# Patient Record
Sex: MALE | HISPANIC OR LATINO | Employment: FULL TIME | URBAN - NONMETROPOLITAN AREA
[De-identification: names, ages, dates, MRNs, and addresses within clinical notes are randomized per-mention and may not be internally consistent; named-entity substitution may affect disease eponyms.]

---

## 2021-05-11 ENCOUNTER — APPOINTMENT (OUTPATIENT)
Dept: GENERAL RADIOLOGY | Facility: HOSPITAL | Age: 66
End: 2021-05-11

## 2021-05-11 ENCOUNTER — HOSPITAL ENCOUNTER (INPATIENT)
Facility: HOSPITAL | Age: 66
LOS: 4 days | Discharge: HOME | End: 2021-05-17
Attending: FAMILY MEDICINE | Admitting: FAMILY MEDICINE

## 2021-05-11 DIAGNOSIS — S92.912B: ICD-10-CM

## 2021-05-11 DIAGNOSIS — Z74.09 IMPAIRED MOBILITY AND ADLS: ICD-10-CM

## 2021-05-11 DIAGNOSIS — Z78.9 IMPAIRED MOBILITY AND ADLS: ICD-10-CM

## 2021-05-11 DIAGNOSIS — L03.116 CELLULITIS OF LEFT LOWER EXTREMITY: Primary | ICD-10-CM

## 2021-05-11 DIAGNOSIS — R73.9 HYPERGLYCEMIA: ICD-10-CM

## 2021-05-11 DIAGNOSIS — L03.115 CELLULITIS OF RIGHT FOOT: ICD-10-CM

## 2021-05-11 DIAGNOSIS — Z74.09 IMPAIRED FUNCTIONAL MOBILITY, BALANCE, AND ENDURANCE: ICD-10-CM

## 2021-05-11 PROBLEM — E87.1 HYPONATREMIA: Status: ACTIVE | Noted: 2021-05-11

## 2021-05-11 PROBLEM — E11.9 TYPE 2 DIABETES MELLITUS (HCC): Status: ACTIVE | Noted: 2021-05-11

## 2021-05-11 LAB
ALBUMIN SERPL-MCNC: 3.6 G/DL (ref 3.5–5.2)
ALBUMIN/GLOB SERPL: 0.9 G/DL
ALP SERPL-CCNC: 126 U/L (ref 39–117)
ALT SERPL W P-5'-P-CCNC: 11 U/L (ref 1–41)
ANION GAP SERPL CALCULATED.3IONS-SCNC: 8 MMOL/L (ref 5–15)
AST SERPL-CCNC: 13 U/L (ref 1–40)
BASOPHILS # BLD AUTO: 0.06 10*3/MM3 (ref 0–0.2)
BASOPHILS NFR BLD AUTO: 0.5 % (ref 0–1.5)
BILIRUB SERPL-MCNC: 0.7 MG/DL (ref 0–1.2)
BUN SERPL-MCNC: 15 MG/DL (ref 8–23)
BUN/CREAT SERPL: 20.8 (ref 7–25)
CALCIUM SPEC-SCNC: 9 MG/DL (ref 8.6–10.5)
CHLORIDE SERPL-SCNC: 92 MMOL/L (ref 98–107)
CK SERPL-CCNC: 64 U/L (ref 20–200)
CO2 SERPL-SCNC: 27 MMOL/L (ref 22–29)
CREAT SERPL-MCNC: 0.72 MG/DL (ref 0.76–1.27)
D-LACTATE SERPL-SCNC: 1.4 MMOL/L (ref 0.5–2)
DEPRECATED RDW RBC AUTO: 47.1 FL (ref 37–54)
EOSINOPHIL # BLD AUTO: 0.01 10*3/MM3 (ref 0–0.4)
EOSINOPHIL NFR BLD AUTO: 0.1 % (ref 0.3–6.2)
ERYTHROCYTE [DISTWIDTH] IN BLOOD BY AUTOMATED COUNT: 14.8 % (ref 12.3–15.4)
FLUAV RNA RESP QL NAA+PROBE: NOT DETECTED
FLUBV RNA RESP QL NAA+PROBE: NOT DETECTED
GFR SERPL CREATININE-BSD FRML MDRD: 110 ML/MIN/1.73
GFR SERPL CREATININE-BSD FRML MDRD: 133 ML/MIN/1.73
GLOBULIN UR ELPH-MCNC: 3.9 GM/DL
GLUCOSE BLDC GLUCOMTR-MCNC: 267 MG/DL (ref 70–130)
GLUCOSE SERPL-MCNC: 341 MG/DL (ref 65–99)
HBA1C MFR BLD: 10.5 % (ref 4.8–5.6)
HCT VFR BLD AUTO: 36.8 % (ref 37.5–51)
HGB BLD-MCNC: 12.3 G/DL (ref 13–17.7)
HOLD SPECIMEN: NORMAL
IMM GRANULOCYTES # BLD AUTO: 0.04 10*3/MM3 (ref 0–0.05)
IMM GRANULOCYTES NFR BLD AUTO: 0.3 % (ref 0–0.5)
LYMPHOCYTES # BLD AUTO: 1.49 10*3/MM3 (ref 0.7–3.1)
LYMPHOCYTES NFR BLD AUTO: 12.7 % (ref 19.6–45.3)
MCH RBC QN AUTO: 29 PG (ref 26.6–33)
MCHC RBC AUTO-ENTMCNC: 33.4 G/DL (ref 31.5–35.7)
MCV RBC AUTO: 86.8 FL (ref 79–97)
MONOCYTES # BLD AUTO: 1.03 10*3/MM3 (ref 0.1–0.9)
MONOCYTES NFR BLD AUTO: 8.8 % (ref 5–12)
NEUTROPHILS NFR BLD AUTO: 77.6 % (ref 42.7–76)
NEUTROPHILS NFR BLD AUTO: 9.14 10*3/MM3 (ref 1.7–7)
NRBC BLD AUTO-RTO: 0 /100 WBC (ref 0–0.2)
PLATELET # BLD AUTO: 396 10*3/MM3 (ref 140–450)
PMV BLD AUTO: 8.8 FL (ref 6–12)
POTASSIUM SERPL-SCNC: 4.2 MMOL/L (ref 3.5–5.2)
PROT SERPL-MCNC: 7.5 G/DL (ref 6–8.5)
RBC # BLD AUTO: 4.24 10*6/MM3 (ref 4.14–5.8)
SARS-COV-2 RNA RESP QL NAA+PROBE: NOT DETECTED
SODIUM SERPL-SCNC: 127 MMOL/L (ref 136–145)
WBC # BLD AUTO: 11.77 10*3/MM3 (ref 3.4–10.8)
WHOLE BLOOD HOLD SPECIMEN: NORMAL

## 2021-05-11 PROCEDURE — 25010000002 CEFEPIME PER 500 MG: Performed by: FAMILY MEDICINE

## 2021-05-11 PROCEDURE — 80053 COMPREHEN METABOLIC PANEL: CPT | Performed by: FAMILY MEDICINE

## 2021-05-11 PROCEDURE — 63710000001 INSULIN ASPART PER 5 UNITS: Performed by: FAMILY MEDICINE

## 2021-05-11 PROCEDURE — 82550 ASSAY OF CK (CPK): CPT | Performed by: FAMILY MEDICINE

## 2021-05-11 PROCEDURE — G0378 HOSPITAL OBSERVATION PER HR: HCPCS

## 2021-05-11 PROCEDURE — 83036 HEMOGLOBIN GLYCOSYLATED A1C: CPT | Performed by: FAMILY MEDICINE

## 2021-05-11 PROCEDURE — 82962 GLUCOSE BLOOD TEST: CPT

## 2021-05-11 PROCEDURE — 83605 ASSAY OF LACTIC ACID: CPT | Performed by: FAMILY MEDICINE

## 2021-05-11 PROCEDURE — 85025 COMPLETE CBC W/AUTO DIFF WBC: CPT | Performed by: FAMILY MEDICINE

## 2021-05-11 PROCEDURE — 99284 EMERGENCY DEPT VISIT MOD MDM: CPT

## 2021-05-11 PROCEDURE — 87636 SARSCOV2 & INF A&B AMP PRB: CPT | Performed by: FAMILY MEDICINE

## 2021-05-11 PROCEDURE — 87040 BLOOD CULTURE FOR BACTERIA: CPT | Performed by: FAMILY MEDICINE

## 2021-05-11 PROCEDURE — 73630 X-RAY EXAM OF FOOT: CPT

## 2021-05-11 PROCEDURE — 63710000001 INSULIN DETEMIR PER 5 UNITS: Performed by: FAMILY MEDICINE

## 2021-05-11 PROCEDURE — 36415 COLL VENOUS BLD VENIPUNCTURE: CPT | Performed by: FAMILY MEDICINE

## 2021-05-11 RX ORDER — SODIUM CHLORIDE 0.9 % (FLUSH) 0.9 %
10 SYRINGE (ML) INJECTION AS NEEDED
Status: DISCONTINUED | OUTPATIENT
Start: 2021-05-11 | End: 2021-05-17 | Stop reason: HOSPADM

## 2021-05-11 RX ORDER — ACETAMINOPHEN 160 MG/5ML
650 SOLUTION ORAL EVERY 4 HOURS PRN
Status: DISCONTINUED | OUTPATIENT
Start: 2021-05-11 | End: 2021-05-17 | Stop reason: HOSPADM

## 2021-05-11 RX ORDER — HYDROCODONE BITARTRATE AND ACETAMINOPHEN 7.5; 325 MG/1; MG/1
1 TABLET ORAL EVERY 4 HOURS PRN
Status: DISCONTINUED | OUTPATIENT
Start: 2021-05-11 | End: 2021-05-17 | Stop reason: HOSPADM

## 2021-05-11 RX ORDER — CALCIUM CARBONATE 200(500)MG
2 TABLET,CHEWABLE ORAL 2 TIMES DAILY PRN
Status: DISCONTINUED | OUTPATIENT
Start: 2021-05-11 | End: 2021-05-17 | Stop reason: HOSPADM

## 2021-05-11 RX ORDER — NALOXONE HCL 0.4 MG/ML
0.4 VIAL (ML) INJECTION
Status: DISCONTINUED | OUTPATIENT
Start: 2021-05-11 | End: 2021-05-17 | Stop reason: HOSPADM

## 2021-05-11 RX ORDER — SODIUM CHLORIDE 0.9 % (FLUSH) 0.9 %
10 SYRINGE (ML) INJECTION EVERY 12 HOURS SCHEDULED
Status: DISCONTINUED | OUTPATIENT
Start: 2021-05-11 | End: 2021-05-17 | Stop reason: HOSPADM

## 2021-05-11 RX ORDER — ONDANSETRON 2 MG/ML
4 INJECTION INTRAMUSCULAR; INTRAVENOUS EVERY 6 HOURS PRN
Status: DISCONTINUED | OUTPATIENT
Start: 2021-05-11 | End: 2021-05-17 | Stop reason: HOSPADM

## 2021-05-11 RX ORDER — ONDANSETRON 4 MG/1
4 TABLET, FILM COATED ORAL EVERY 6 HOURS PRN
Status: DISCONTINUED | OUTPATIENT
Start: 2021-05-11 | End: 2021-05-17 | Stop reason: HOSPADM

## 2021-05-11 RX ORDER — ACETAMINOPHEN 650 MG/1
650 SUPPOSITORY RECTAL EVERY 4 HOURS PRN
Status: DISCONTINUED | OUTPATIENT
Start: 2021-05-11 | End: 2021-05-17 | Stop reason: HOSPADM

## 2021-05-11 RX ORDER — DEXTROSE MONOHYDRATE 25 G/50ML
25 INJECTION, SOLUTION INTRAVENOUS
Status: DISCONTINUED | OUTPATIENT
Start: 2021-05-11 | End: 2021-05-17 | Stop reason: HOSPADM

## 2021-05-11 RX ORDER — NICOTINE POLACRILEX 4 MG
15 LOZENGE BUCCAL
Status: DISCONTINUED | OUTPATIENT
Start: 2021-05-11 | End: 2021-05-17 | Stop reason: HOSPADM

## 2021-05-11 RX ORDER — SODIUM CHLORIDE 9 MG/ML
75 INJECTION, SOLUTION INTRAVENOUS CONTINUOUS
Status: DISCONTINUED | OUTPATIENT
Start: 2021-05-11 | End: 2021-05-17 | Stop reason: HOSPADM

## 2021-05-11 RX ORDER — ACETAMINOPHEN 325 MG/1
650 TABLET ORAL EVERY 4 HOURS PRN
Status: DISCONTINUED | OUTPATIENT
Start: 2021-05-11 | End: 2021-05-17 | Stop reason: HOSPADM

## 2021-05-11 RX ORDER — MORPHINE SULFATE 2 MG/ML
1 INJECTION, SOLUTION INTRAMUSCULAR; INTRAVENOUS EVERY 4 HOURS PRN
Status: DISCONTINUED | OUTPATIENT
Start: 2021-05-11 | End: 2021-05-17 | Stop reason: HOSPADM

## 2021-05-11 RX ADMIN — INSULIN DETEMIR 10 UNITS: 100 INJECTION, SOLUTION SUBCUTANEOUS at 22:25

## 2021-05-11 RX ADMIN — INSULIN ASPART 4 UNITS: 100 INJECTION, SOLUTION INTRAVENOUS; SUBCUTANEOUS at 22:24

## 2021-05-11 RX ADMIN — VANCOMYCIN HYDROCHLORIDE 1000 MG: 1 INJECTION, POWDER, LYOPHILIZED, FOR SOLUTION INTRAVENOUS at 18:49

## 2021-05-11 RX ADMIN — CEFEPIME HYDROCHLORIDE 2 G: 2 INJECTION, POWDER, FOR SOLUTION INTRAVENOUS at 17:52

## 2021-05-11 RX ADMIN — SODIUM CHLORIDE 75 ML/HR: 900 INJECTION, SOLUTION INTRAVENOUS at 20:27

## 2021-05-12 LAB
ALBUMIN SERPL-MCNC: 3.5 G/DL (ref 3.5–5.2)
ALBUMIN/GLOB SERPL: 1.2 G/DL
ALP SERPL-CCNC: 129 U/L (ref 39–117)
ALT SERPL W P-5'-P-CCNC: 8 U/L (ref 1–41)
ANION GAP SERPL CALCULATED.3IONS-SCNC: 7 MMOL/L (ref 5–15)
AST SERPL-CCNC: 12 U/L (ref 1–40)
BASOPHILS # BLD AUTO: 0.05 10*3/MM3 (ref 0–0.2)
BASOPHILS NFR BLD AUTO: 0.4 % (ref 0–1.5)
BILIRUB SERPL-MCNC: 0.8 MG/DL (ref 0–1.2)
BUN SERPL-MCNC: 11 MG/DL (ref 8–23)
BUN/CREAT SERPL: 20.8 (ref 7–25)
CALCIUM SPEC-SCNC: 8.7 MG/DL (ref 8.6–10.5)
CHLORIDE SERPL-SCNC: 103 MMOL/L (ref 98–107)
CO2 SERPL-SCNC: 30 MMOL/L (ref 22–29)
CREAT SERPL-MCNC: 0.53 MG/DL (ref 0.76–1.27)
CRP SERPL-MCNC: 10.41 MG/DL (ref 0–0.5)
DEPRECATED RDW RBC AUTO: 47.6 FL (ref 37–54)
EOSINOPHIL # BLD AUTO: 0.05 10*3/MM3 (ref 0–0.4)
EOSINOPHIL NFR BLD AUTO: 0.4 % (ref 0.3–6.2)
ERYTHROCYTE [DISTWIDTH] IN BLOOD BY AUTOMATED COUNT: 14.8 % (ref 12.3–15.4)
ERYTHROCYTE [SEDIMENTATION RATE] IN BLOOD: 84 MM/HR (ref 0–15)
GFR SERPL CREATININE-BSD FRML MDRD: >150 ML/MIN/1.73
GFR SERPL CREATININE-BSD FRML MDRD: >150 ML/MIN/1.73
GLOBULIN UR ELPH-MCNC: 3 GM/DL
GLUCOSE BLDC GLUCOMTR-MCNC: 169 MG/DL (ref 70–130)
GLUCOSE BLDC GLUCOMTR-MCNC: 181 MG/DL (ref 70–130)
GLUCOSE BLDC GLUCOMTR-MCNC: 277 MG/DL (ref 70–130)
GLUCOSE BLDC GLUCOMTR-MCNC: 346 MG/DL (ref 70–130)
GLUCOSE BLDC GLUCOMTR-MCNC: 59 MG/DL (ref 70–130)
GLUCOSE SERPL-MCNC: 69 MG/DL (ref 65–99)
HCT VFR BLD AUTO: 35.4 % (ref 37.5–51)
HGB BLD-MCNC: 11.8 G/DL (ref 13–17.7)
IMM GRANULOCYTES # BLD AUTO: 0.04 10*3/MM3 (ref 0–0.05)
IMM GRANULOCYTES NFR BLD AUTO: 0.3 % (ref 0–0.5)
LYMPHOCYTES # BLD AUTO: 2.03 10*3/MM3 (ref 0.7–3.1)
LYMPHOCYTES NFR BLD AUTO: 17.4 % (ref 19.6–45.3)
MCH RBC QN AUTO: 28.9 PG (ref 26.6–33)
MCHC RBC AUTO-ENTMCNC: 33.3 G/DL (ref 31.5–35.7)
MCV RBC AUTO: 86.8 FL (ref 79–97)
MONOCYTES # BLD AUTO: 1.17 10*3/MM3 (ref 0.1–0.9)
MONOCYTES NFR BLD AUTO: 10.1 % (ref 5–12)
NEUTROPHILS NFR BLD AUTO: 71.4 % (ref 42.7–76)
NEUTROPHILS NFR BLD AUTO: 8.3 10*3/MM3 (ref 1.7–7)
NRBC BLD AUTO-RTO: 0 /100 WBC (ref 0–0.2)
PLATELET # BLD AUTO: 398 10*3/MM3 (ref 140–450)
PMV BLD AUTO: 8.9 FL (ref 6–12)
POTASSIUM SERPL-SCNC: 3.7 MMOL/L (ref 3.5–5.2)
PROT SERPL-MCNC: 6.5 G/DL (ref 6–8.5)
RBC # BLD AUTO: 4.08 10*6/MM3 (ref 4.14–5.8)
SODIUM SERPL-SCNC: 140 MMOL/L (ref 136–145)
VANCOMYCIN PEAK SERPL-MCNC: 15.7 MCG/ML (ref 20–40)
WBC # BLD AUTO: 11.64 10*3/MM3 (ref 3.4–10.8)

## 2021-05-12 PROCEDURE — 94760 N-INVAS EAR/PLS OXIMETRY 1: CPT

## 2021-05-12 PROCEDURE — 80053 COMPREHEN METABOLIC PANEL: CPT | Performed by: FAMILY MEDICINE

## 2021-05-12 PROCEDURE — 85025 COMPLETE CBC W/AUTO DIFF WBC: CPT | Performed by: FAMILY MEDICINE

## 2021-05-12 PROCEDURE — 82962 GLUCOSE BLOOD TEST: CPT

## 2021-05-12 PROCEDURE — 25010000002 CEFEPIME PER 500 MG: Performed by: FAMILY MEDICINE

## 2021-05-12 PROCEDURE — 80202 ASSAY OF VANCOMYCIN: CPT | Performed by: FAMILY MEDICINE

## 2021-05-12 PROCEDURE — G0378 HOSPITAL OBSERVATION PER HR: HCPCS

## 2021-05-12 PROCEDURE — 85651 RBC SED RATE NONAUTOMATED: CPT | Performed by: PODIATRIST

## 2021-05-12 PROCEDURE — 25010000002 VANCOMYCIN 5 G RECONSTITUTED SOLUTION: Performed by: FAMILY MEDICINE

## 2021-05-12 PROCEDURE — 63710000001 INSULIN ASPART PER 5 UNITS: Performed by: FAMILY MEDICINE

## 2021-05-12 PROCEDURE — 99221 1ST HOSP IP/OBS SF/LOW 40: CPT | Performed by: PODIATRIST

## 2021-05-12 PROCEDURE — 86140 C-REACTIVE PROTEIN: CPT | Performed by: PODIATRIST

## 2021-05-12 PROCEDURE — 94799 UNLISTED PULMONARY SVC/PX: CPT

## 2021-05-12 RX ADMIN — CEFEPIME HYDROCHLORIDE 1 G: 1 INJECTION, POWDER, FOR SOLUTION INTRAMUSCULAR; INTRAVENOUS at 01:57

## 2021-05-12 RX ADMIN — DEXTROSE MONOHYDRATE 25 G: 500 INJECTION PARENTERAL at 06:16

## 2021-05-12 RX ADMIN — INSULIN ASPART 4 UNITS: 100 INJECTION, SOLUTION INTRAVENOUS; SUBCUTANEOUS at 17:55

## 2021-05-12 RX ADMIN — VANCOMYCIN HYDROCHLORIDE 750 MG: 5 INJECTION, POWDER, LYOPHILIZED, FOR SOLUTION INTRAVENOUS at 06:16

## 2021-05-12 RX ADMIN — SODIUM CHLORIDE, PRESERVATIVE FREE 10 ML: 5 INJECTION INTRAVENOUS at 20:42

## 2021-05-12 RX ADMIN — INSULIN ASPART 2 UNITS: 100 INJECTION, SOLUTION INTRAVENOUS; SUBCUTANEOUS at 11:29

## 2021-05-12 RX ADMIN — CEFEPIME HYDROCHLORIDE 1 G: 1 INJECTION, POWDER, FOR SOLUTION INTRAMUSCULAR; INTRAVENOUS at 17:16

## 2021-05-12 RX ADMIN — CEFEPIME HYDROCHLORIDE 1 G: 1 INJECTION, POWDER, FOR SOLUTION INTRAMUSCULAR; INTRAVENOUS at 08:40

## 2021-05-12 RX ADMIN — SODIUM CHLORIDE, PRESERVATIVE FREE 10 ML: 5 INJECTION INTRAVENOUS at 08:40

## 2021-05-12 RX ADMIN — VANCOMYCIN HYDROCHLORIDE 750 MG: 5 INJECTION, POWDER, LYOPHILIZED, FOR SOLUTION INTRAVENOUS at 17:29

## 2021-05-13 ENCOUNTER — ANESTHESIA EVENT (OUTPATIENT)
Dept: PERIOP | Facility: HOSPITAL | Age: 66
End: 2021-05-13

## 2021-05-13 PROBLEM — L03.116 CELLULITIS OF LEFT LOWER EXTREMITY: Status: ACTIVE | Noted: 2021-05-13

## 2021-05-13 LAB
ALBUMIN SERPL-MCNC: 2.9 G/DL (ref 3.5–5.2)
ALBUMIN/GLOB SERPL: 0.9 G/DL
ALP SERPL-CCNC: 91 U/L (ref 39–117)
ALT SERPL W P-5'-P-CCNC: 7 U/L (ref 1–41)
ANION GAP SERPL CALCULATED.3IONS-SCNC: 7 MMOL/L (ref 5–15)
AST SERPL-CCNC: 9 U/L (ref 1–40)
BASOPHILS # BLD AUTO: 0.04 10*3/MM3 (ref 0–0.2)
BASOPHILS NFR BLD AUTO: 0.4 % (ref 0–1.5)
BILIRUB SERPL-MCNC: 0.8 MG/DL (ref 0–1.2)
BUN SERPL-MCNC: 7 MG/DL (ref 8–23)
BUN/CREAT SERPL: 15.6 (ref 7–25)
CALCIUM SPEC-SCNC: 8.3 MG/DL (ref 8.6–10.5)
CHLORIDE SERPL-SCNC: 99 MMOL/L (ref 98–107)
CO2 SERPL-SCNC: 24 MMOL/L (ref 22–29)
CREAT SERPL-MCNC: 0.45 MG/DL (ref 0.76–1.27)
DEPRECATED RDW RBC AUTO: 45.1 FL (ref 37–54)
EOSINOPHIL # BLD AUTO: 0.04 10*3/MM3 (ref 0–0.4)
EOSINOPHIL NFR BLD AUTO: 0.4 % (ref 0.3–6.2)
ERYTHROCYTE [DISTWIDTH] IN BLOOD BY AUTOMATED COUNT: 14.5 % (ref 12.3–15.4)
GFR SERPL CREATININE-BSD FRML MDRD: >150 ML/MIN/1.73
GFR SERPL CREATININE-BSD FRML MDRD: >150 ML/MIN/1.73
GLOBULIN UR ELPH-MCNC: 3.2 GM/DL
GLUCOSE BLDC GLUCOMTR-MCNC: 231 MG/DL (ref 70–130)
GLUCOSE BLDC GLUCOMTR-MCNC: 259 MG/DL (ref 70–130)
GLUCOSE BLDC GLUCOMTR-MCNC: 290 MG/DL (ref 70–130)
GLUCOSE BLDC GLUCOMTR-MCNC: 371 MG/DL (ref 70–130)
GLUCOSE SERPL-MCNC: 219 MG/DL (ref 65–99)
HCT VFR BLD AUTO: 33.5 % (ref 37.5–51)
HGB BLD-MCNC: 11.5 G/DL (ref 13–17.7)
IMM GRANULOCYTES # BLD AUTO: 0.04 10*3/MM3 (ref 0–0.05)
IMM GRANULOCYTES NFR BLD AUTO: 0.4 % (ref 0–0.5)
LYMPHOCYTES # BLD AUTO: 1.49 10*3/MM3 (ref 0.7–3.1)
LYMPHOCYTES NFR BLD AUTO: 13.9 % (ref 19.6–45.3)
MCH RBC QN AUTO: 29.2 PG (ref 26.6–33)
MCHC RBC AUTO-ENTMCNC: 34.3 G/DL (ref 31.5–35.7)
MCV RBC AUTO: 85 FL (ref 79–97)
MONOCYTES # BLD AUTO: 1.05 10*3/MM3 (ref 0.1–0.9)
MONOCYTES NFR BLD AUTO: 9.8 % (ref 5–12)
NEUTROPHILS NFR BLD AUTO: 75.1 % (ref 42.7–76)
NEUTROPHILS NFR BLD AUTO: 8.08 10*3/MM3 (ref 1.7–7)
NRBC BLD AUTO-RTO: 0 /100 WBC (ref 0–0.2)
PLATELET # BLD AUTO: 367 10*3/MM3 (ref 140–450)
PMV BLD AUTO: 8.6 FL (ref 6–12)
POTASSIUM SERPL-SCNC: 3.6 MMOL/L (ref 3.5–5.2)
PROT SERPL-MCNC: 6.1 G/DL (ref 6–8.5)
RBC # BLD AUTO: 3.94 10*6/MM3 (ref 4.14–5.8)
SODIUM SERPL-SCNC: 130 MMOL/L (ref 136–145)
VANCOMYCIN TROUGH SERPL-MCNC: 5.1 MCG/ML (ref 5–20)
WBC # BLD AUTO: 10.74 10*3/MM3 (ref 3.4–10.8)

## 2021-05-13 PROCEDURE — 80053 COMPREHEN METABOLIC PANEL: CPT | Performed by: FAMILY MEDICINE

## 2021-05-13 PROCEDURE — 82962 GLUCOSE BLOOD TEST: CPT

## 2021-05-13 PROCEDURE — 25010000002 CEFEPIME PER 500 MG: Performed by: FAMILY MEDICINE

## 2021-05-13 PROCEDURE — 85025 COMPLETE CBC W/AUTO DIFF WBC: CPT | Performed by: FAMILY MEDICINE

## 2021-05-13 PROCEDURE — 25010000002 VANCOMYCIN 1 G RECONSTITUTED SOLUTION: Performed by: PODIATRIST

## 2021-05-13 PROCEDURE — 80202 ASSAY OF VANCOMYCIN: CPT | Performed by: FAMILY MEDICINE

## 2021-05-13 PROCEDURE — 63710000001 INSULIN ASPART PER 5 UNITS: Performed by: FAMILY MEDICINE

## 2021-05-13 PROCEDURE — 99233 SBSQ HOSP IP/OBS HIGH 50: CPT | Performed by: PODIATRIST

## 2021-05-13 PROCEDURE — 25010000002 VANCOMYCIN 5 G RECONSTITUTED SOLUTION: Performed by: FAMILY MEDICINE

## 2021-05-13 PROCEDURE — 36415 COLL VENOUS BLD VENIPUNCTURE: CPT | Performed by: FAMILY MEDICINE

## 2021-05-13 RX ORDER — LANOLIN ALCOHOL/MO/W.PET/CERES
3 CREAM (GRAM) TOPICAL NIGHTLY
Status: DISCONTINUED | OUTPATIENT
Start: 2021-05-13 | End: 2021-05-17 | Stop reason: HOSPADM

## 2021-05-13 RX ADMIN — SODIUM CHLORIDE 75 ML/HR: 900 INJECTION, SOLUTION INTRAVENOUS at 08:14

## 2021-05-13 RX ADMIN — CEFEPIME HYDROCHLORIDE 1 G: 1 INJECTION, POWDER, FOR SOLUTION INTRAMUSCULAR; INTRAVENOUS at 08:13

## 2021-05-13 RX ADMIN — INSULIN ASPART 3 UNITS: 100 INJECTION, SOLUTION INTRAVENOUS; SUBCUTANEOUS at 07:33

## 2021-05-13 RX ADMIN — SODIUM CHLORIDE, PRESERVATIVE FREE 10 ML: 5 INJECTION INTRAVENOUS at 08:14

## 2021-05-13 RX ADMIN — VANCOMYCIN HYDROCHLORIDE 750 MG: 5 INJECTION, POWDER, LYOPHILIZED, FOR SOLUTION INTRAVENOUS at 06:38

## 2021-05-13 RX ADMIN — MELATONIN 3 MG: at 21:50

## 2021-05-13 RX ADMIN — SODIUM CHLORIDE, PRESERVATIVE FREE 10 ML: 5 INJECTION INTRAVENOUS at 21:50

## 2021-05-13 RX ADMIN — CEFEPIME HYDROCHLORIDE 1 G: 1 INJECTION, POWDER, FOR SOLUTION INTRAMUSCULAR; INTRAVENOUS at 02:01

## 2021-05-13 RX ADMIN — CEFEPIME HYDROCHLORIDE 1 G: 1 INJECTION, POWDER, FOR SOLUTION INTRAMUSCULAR; INTRAVENOUS at 17:35

## 2021-05-13 RX ADMIN — INSULIN ASPART 6 UNITS: 100 INJECTION, SOLUTION INTRAVENOUS; SUBCUTANEOUS at 16:39

## 2021-05-13 RX ADMIN — Medication: at 06:25

## 2021-05-13 RX ADMIN — SODIUM CHLORIDE 75 ML/HR: 900 INJECTION, SOLUTION INTRAVENOUS at 23:33

## 2021-05-13 RX ADMIN — VANCOMYCIN HYDROCHLORIDE 1000 MG: 1 INJECTION, POWDER, LYOPHILIZED, FOR SOLUTION INTRAVENOUS at 16:40

## 2021-05-13 RX ADMIN — INSULIN ASPART 4 UNITS: 100 INJECTION, SOLUTION INTRAVENOUS; SUBCUTANEOUS at 10:55

## 2021-05-13 RX ADMIN — VANCOMYCIN HYDROCHLORIDE 1000 MG: 1 INJECTION, POWDER, LYOPHILIZED, FOR SOLUTION INTRAVENOUS at 22:11

## 2021-05-13 NOTE — ANESTHESIA PREPROCEDURE EVALUATION
Anesthesia Evaluation     Patient summary reviewed and Nursing notes reviewed   no history of anesthetic complications:  NPO Solid Status: > 8 hours  NPO Liquid Status: > 8 hours           Airway   Mallampati: II  TM distance: >3 FB  Neck ROM: full  possible difficult intubation  Dental    (+) poor dentation    Comment: Remaining upper and lower dentition in hopeless repair;snags,carious,chipped and cracked.     Pulmonary - negative pulmonary ROS and normal exam    breath sounds clear to auscultation  (-) not a smoker  Cardiovascular - negative cardio ROS and normal exam    Rhythm: regular  Rate: normal    (-) murmur      Neuro/Psych- negative ROS  GI/Hepatic/Renal/Endo    (+)   liver disease history of elevated LFT, diabetes mellitus type 2 using insulin,     ROS Comment: Sodium 130.    Musculoskeletal (-) negative ROS    Abdominal    Substance History - negative use     OB/GYN negative ob/gyn ROS         Other - negative ROS       ROS/Med Hx Other: HGB 11.5 HCT 33.5                  Anesthesia Plan    ASA 3     MAC and general   total IV anesthesia(Patient bilingual. Understood anesthesia plan,risks, benefits and alternatives/agrees.)  intravenous induction     Anesthetic plan, all risks, benefits, and alternatives have been provided, discussed and informed consent has been obtained with: patient.

## 2021-05-14 ENCOUNTER — ANESTHESIA (OUTPATIENT)
Dept: PERIOP | Facility: HOSPITAL | Age: 66
End: 2021-05-14

## 2021-05-14 ENCOUNTER — APPOINTMENT (OUTPATIENT)
Dept: GENERAL RADIOLOGY | Facility: HOSPITAL | Age: 66
End: 2021-05-14

## 2021-05-14 PROBLEM — E43 SEVERE MALNUTRITION (HCC): Status: ACTIVE | Noted: 2021-05-14

## 2021-05-14 LAB
ALBUMIN SERPL-MCNC: 3.1 G/DL (ref 3.5–5.2)
ALBUMIN/GLOB SERPL: 1 G/DL
ALP SERPL-CCNC: 93 U/L (ref 39–117)
ALT SERPL W P-5'-P-CCNC: 8 U/L (ref 1–41)
ANION GAP SERPL CALCULATED.3IONS-SCNC: 6 MMOL/L (ref 5–15)
AST SERPL-CCNC: 12 U/L (ref 1–40)
BASOPHILS # BLD AUTO: 0.04 10*3/MM3 (ref 0–0.2)
BASOPHILS NFR BLD AUTO: 0.4 % (ref 0–1.5)
BILIRUB SERPL-MCNC: 0.7 MG/DL (ref 0–1.2)
BUN SERPL-MCNC: 8 MG/DL (ref 8–23)
BUN/CREAT SERPL: 14.3 (ref 7–25)
CALCIUM SPEC-SCNC: 8.6 MG/DL (ref 8.6–10.5)
CHLORIDE SERPL-SCNC: 102 MMOL/L (ref 98–107)
CO2 SERPL-SCNC: 27 MMOL/L (ref 22–29)
CREAT SERPL-MCNC: 0.56 MG/DL (ref 0.76–1.27)
DEPRECATED RDW RBC AUTO: 46.8 FL (ref 37–54)
EOSINOPHIL # BLD AUTO: 0.11 10*3/MM3 (ref 0–0.4)
EOSINOPHIL NFR BLD AUTO: 1 % (ref 0.3–6.2)
ERYTHROCYTE [DISTWIDTH] IN BLOOD BY AUTOMATED COUNT: 14.7 % (ref 12.3–15.4)
GFR SERPL CREATININE-BSD FRML MDRD: 147 ML/MIN/1.73
GFR SERPL CREATININE-BSD FRML MDRD: >150 ML/MIN/1.73
GLOBULIN UR ELPH-MCNC: 3.2 GM/DL
GLUCOSE BLDC GLUCOMTR-MCNC: 154 MG/DL (ref 70–130)
GLUCOSE BLDC GLUCOMTR-MCNC: 167 MG/DL (ref 70–130)
GLUCOSE BLDC GLUCOMTR-MCNC: 176 MG/DL (ref 70–130)
GLUCOSE BLDC GLUCOMTR-MCNC: 186 MG/DL (ref 70–130)
GLUCOSE BLDC GLUCOMTR-MCNC: 187 MG/DL (ref 70–130)
GLUCOSE BLDC GLUCOMTR-MCNC: 222 MG/DL (ref 70–130)
GLUCOSE SERPL-MCNC: 192 MG/DL (ref 65–99)
HCT VFR BLD AUTO: 33.9 % (ref 37.5–51)
HGB BLD-MCNC: 11.3 G/DL (ref 13–17.7)
IMM GRANULOCYTES # BLD AUTO: 0.07 10*3/MM3 (ref 0–0.05)
IMM GRANULOCYTES NFR BLD AUTO: 0.6 % (ref 0–0.5)
LYMPHOCYTES # BLD AUTO: 1.81 10*3/MM3 (ref 0.7–3.1)
LYMPHOCYTES NFR BLD AUTO: 16.1 % (ref 19.6–45.3)
MCH RBC QN AUTO: 28.9 PG (ref 26.6–33)
MCHC RBC AUTO-ENTMCNC: 33.3 G/DL (ref 31.5–35.7)
MCV RBC AUTO: 86.7 FL (ref 79–97)
MONOCYTES # BLD AUTO: 1.11 10*3/MM3 (ref 0.1–0.9)
MONOCYTES NFR BLD AUTO: 9.9 % (ref 5–12)
NEUTROPHILS NFR BLD AUTO: 72 % (ref 42.7–76)
NEUTROPHILS NFR BLD AUTO: 8.1 10*3/MM3 (ref 1.7–7)
NRBC BLD AUTO-RTO: 0 /100 WBC (ref 0–0.2)
PLATELET # BLD AUTO: 378 10*3/MM3 (ref 140–450)
PMV BLD AUTO: 8.7 FL (ref 6–12)
POTASSIUM SERPL-SCNC: 3.8 MMOL/L (ref 3.5–5.2)
PROT SERPL-MCNC: 6.3 G/DL (ref 6–8.5)
RBC # BLD AUTO: 3.91 10*6/MM3 (ref 4.14–5.8)
SODIUM SERPL-SCNC: 135 MMOL/L (ref 136–145)
VANCOMYCIN PEAK SERPL-MCNC: 19 MCG/ML (ref 20–40)
VANCOMYCIN TROUGH SERPL-MCNC: 13.5 MCG/ML (ref 5–20)
WBC # BLD AUTO: 11.24 10*3/MM3 (ref 3.4–10.8)

## 2021-05-14 PROCEDURE — 25010000002 VANCOMYCIN 1 G RECONSTITUTED SOLUTION: Performed by: PODIATRIST

## 2021-05-14 PROCEDURE — 25010000002 CEFEPIME PER 500 MG: Performed by: PODIATRIST

## 2021-05-14 PROCEDURE — 87186 SC STD MICRODIL/AGAR DIL: CPT | Performed by: PODIATRIST

## 2021-05-14 PROCEDURE — 80053 COMPREHEN METABOLIC PANEL: CPT | Performed by: FAMILY MEDICINE

## 2021-05-14 PROCEDURE — 85025 COMPLETE CBC W/AUTO DIFF WBC: CPT | Performed by: FAMILY MEDICINE

## 2021-05-14 PROCEDURE — 82962 GLUCOSE BLOOD TEST: CPT

## 2021-05-14 PROCEDURE — 87205 SMEAR GRAM STAIN: CPT | Performed by: PODIATRIST

## 2021-05-14 PROCEDURE — 63710000001 INSULIN ASPART PER 5 UNITS: Performed by: PODIATRIST

## 2021-05-14 PROCEDURE — 87070 CULTURE OTHR SPECIMN AEROBIC: CPT | Performed by: PODIATRIST

## 2021-05-14 PROCEDURE — 0Y6M0Z5 DETACHMENT AT RIGHT FOOT, COMPLETE 2ND RAY, OPEN APPROACH: ICD-10-PCS | Performed by: PODIATRIST

## 2021-05-14 PROCEDURE — 80202 ASSAY OF VANCOMYCIN: CPT | Performed by: PODIATRIST

## 2021-05-14 PROCEDURE — 28820 AMPUTATION OF TOE: CPT | Performed by: PODIATRIST

## 2021-05-14 PROCEDURE — 25010000002 PROPOFOL 10 MG/ML EMULSION: Performed by: NURSE ANESTHETIST, CERTIFIED REGISTERED

## 2021-05-14 PROCEDURE — 25010000002 ONDANSETRON PER 1 MG: Performed by: NURSE ANESTHETIST, CERTIFIED REGISTERED

## 2021-05-14 PROCEDURE — 97165 OT EVAL LOW COMPLEX 30 MIN: CPT

## 2021-05-14 PROCEDURE — 87147 CULTURE TYPE IMMUNOLOGIC: CPT | Performed by: PODIATRIST

## 2021-05-14 PROCEDURE — 97162 PT EVAL MOD COMPLEX 30 MIN: CPT

## 2021-05-14 PROCEDURE — 25010000002 CEFEPIME PER 500 MG: Performed by: FAMILY MEDICINE

## 2021-05-14 PROCEDURE — 25010000002 FENTANYL CITRATE (PF) 100 MCG/2ML SOLUTION: Performed by: NURSE ANESTHETIST, CERTIFIED REGISTERED

## 2021-05-14 RX ORDER — ONDANSETRON 2 MG/ML
INJECTION INTRAMUSCULAR; INTRAVENOUS AS NEEDED
Status: DISCONTINUED | OUTPATIENT
Start: 2021-05-14 | End: 2021-05-14 | Stop reason: SURG

## 2021-05-14 RX ORDER — LIDOCAINE HYDROCHLORIDE 20 MG/ML
INJECTION, SOLUTION EPIDURAL; INFILTRATION; INTRACAUDAL; PERINEURAL AS NEEDED
Status: DISCONTINUED | OUTPATIENT
Start: 2021-05-14 | End: 2021-05-14 | Stop reason: SURG

## 2021-05-14 RX ORDER — FENTANYL CITRATE 50 UG/ML
INJECTION, SOLUTION INTRAMUSCULAR; INTRAVENOUS AS NEEDED
Status: DISCONTINUED | OUTPATIENT
Start: 2021-05-14 | End: 2021-05-14 | Stop reason: SURG

## 2021-05-14 RX ORDER — MEPERIDINE HYDROCHLORIDE 25 MG/ML
12.5 INJECTION INTRAMUSCULAR; INTRAVENOUS; SUBCUTANEOUS
Status: DISCONTINUED | OUTPATIENT
Start: 2021-05-14 | End: 2021-05-14 | Stop reason: HOSPADM

## 2021-05-14 RX ORDER — BUPIVACAINE HYDROCHLORIDE 5 MG/ML
INJECTION, SOLUTION EPIDURAL; INTRACAUDAL AS NEEDED
Status: DISCONTINUED | OUTPATIENT
Start: 2021-05-14 | End: 2021-05-14 | Stop reason: HOSPADM

## 2021-05-14 RX ORDER — PROPOFOL 10 MG/ML
VIAL (ML) INTRAVENOUS AS NEEDED
Status: DISCONTINUED | OUTPATIENT
Start: 2021-05-14 | End: 2021-05-14 | Stop reason: SURG

## 2021-05-14 RX ORDER — SODIUM CHLORIDE, SODIUM GLUCONATE, SODIUM ACETATE, POTASSIUM CHLORIDE, AND MAGNESIUM CHLORIDE 526; 502; 368; 37; 30 MG/100ML; MG/100ML; MG/100ML; MG/100ML; MG/100ML
INJECTION, SOLUTION INTRAVENOUS CONTINUOUS PRN
Status: DISCONTINUED | OUTPATIENT
Start: 2021-05-14 | End: 2021-05-14 | Stop reason: SURG

## 2021-05-14 RX ADMIN — CEFEPIME HYDROCHLORIDE 1 G: 1 INJECTION, POWDER, FOR SOLUTION INTRAMUSCULAR; INTRAVENOUS at 13:25

## 2021-05-14 RX ADMIN — CEFEPIME HYDROCHLORIDE 1 G: 1 INJECTION, POWDER, FOR SOLUTION INTRAMUSCULAR; INTRAVENOUS at 01:31

## 2021-05-14 RX ADMIN — INSULIN ASPART 2 UNITS: 100 INJECTION, SOLUTION INTRAVENOUS; SUBCUTANEOUS at 17:31

## 2021-05-14 RX ADMIN — VANCOMYCIN HYDROCHLORIDE 1000 MG: 1 INJECTION, POWDER, LYOPHILIZED, FOR SOLUTION INTRAVENOUS at 16:02

## 2021-05-14 RX ADMIN — ONDANSETRON 4 MG: 2 INJECTION INTRAMUSCULAR; INTRAVENOUS at 08:20

## 2021-05-14 RX ADMIN — HYDROCODONE BITARTRATE AND ACETAMINOPHEN 1 TABLET: 7.5; 325 TABLET ORAL at 21:54

## 2021-05-14 RX ADMIN — SODIUM CHLORIDE, PRESERVATIVE FREE 10 ML: 5 INJECTION INTRAVENOUS at 21:48

## 2021-05-14 RX ADMIN — FENTANYL CITRATE 100 MCG: 50 INJECTION INTRAMUSCULAR; INTRAVENOUS at 07:57

## 2021-05-14 RX ADMIN — SODIUM CHLORIDE 75 ML/HR: 900 INJECTION, SOLUTION INTRAVENOUS at 13:25

## 2021-05-14 RX ADMIN — PROPOFOL 150 MG: 10 INJECTION, EMULSION INTRAVENOUS at 07:55

## 2021-05-14 RX ADMIN — LIDOCAINE HYDROCHLORIDE 100 MG: 20 INJECTION, SOLUTION EPIDURAL; INFILTRATION; INTRACAUDAL; PERINEURAL at 07:55

## 2021-05-14 RX ADMIN — MELATONIN 3 MG: at 21:48

## 2021-05-14 RX ADMIN — PROPOFOL 50 MG: 10 INJECTION, EMULSION INTRAVENOUS at 07:57

## 2021-05-14 RX ADMIN — HYDROCODONE BITARTRATE AND ACETAMINOPHEN 1 TABLET: 7.5; 325 TABLET ORAL at 01:36

## 2021-05-14 RX ADMIN — VANCOMYCIN HYDROCHLORIDE 1000 MG: 1 INJECTION, POWDER, LYOPHILIZED, FOR SOLUTION INTRAVENOUS at 06:00

## 2021-05-14 RX ADMIN — CEFEPIME HYDROCHLORIDE 1 G: 1 INJECTION, POWDER, FOR SOLUTION INTRAMUSCULAR; INTRAVENOUS at 17:31

## 2021-05-14 RX ADMIN — VANCOMYCIN HYDROCHLORIDE 1000 MG: 1 INJECTION, POWDER, LYOPHILIZED, FOR SOLUTION INTRAVENOUS at 23:30

## 2021-05-14 RX ADMIN — SODIUM CHLORIDE, SODIUM GLUCONATE, SODIUM ACETATE, POTASSIUM CHLORIDE, AND MAGNESIUM CHLORIDE: 526; 502; 368; 37; 30 INJECTION, SOLUTION INTRAVENOUS at 08:13

## 2021-05-14 NOTE — ANESTHESIA PROCEDURE NOTES
Airway  Urgency: elective    Date/Time: 5/14/2021 7:58 AM  Airway not difficult    General Information and Staff    Patient location during procedure: OR  CRNA: Romie Sue CRNA    Indications and Patient Condition  Indications for airway management: airway protection    Preoxygenated: yes  Mask difficulty assessment: 1 - vent by mask    Final Airway Details  Final airway type: endotracheal airway      Successful airway: ETT  Cuffed: yes   Successful intubation technique: video laryngoscopy  Facilitating devices/methods: anterior pressure/BURP  Endotracheal tube insertion site: oral  Blade: Shepherd  Blade size: 3  ETT size (mm): 7.5  Cormack-Lehane Classification: grade IIa - partial view of glottis  Placement verified by: chest auscultation and capnometry   Measured from: lips  Number of attempts at approach: 1  Assessment: lips, teeth, and gum same as pre-op and atraumatic intubation    Additional Comments  LMA 4 placement attempted unsuccessfully. LMA 3 placement attempted unsuccessfully. Patient intubated with Shepherd. Patient airway very anterior, with acute angle of the postoral pharynx. Teeth checked after intubation.  No damage noted.

## 2021-05-14 NOTE — ANESTHESIA POSTPROCEDURE EVALUATION
Patient: Jeffry Blanton    Procedure Summary     Date: 05/14/21 Room / Location: [facility] OR  / [facility] OR    Anesthesia Start: 0745 Anesthesia Stop:     Procedure: AMPUTATION SECOND DIGIT RIGHT (Right ) Diagnosis:       Cellulitis of right foot      (Cellulitis of right foot [L03.115])    Surgeons: Kemar Ordaz DPM Provider: Rey Murphy MD    Anesthesia Type: MAC, general ASA Status: 3          Anesthesia Type: MAC, general    Vitals  No vitals data found for the desired time range.          Post Anesthesia Care and Evaluation    Patient location during evaluation: bedside  Patient participation: complete - patient cannot participate  Level of consciousness: awake  Pain score: 0  Pain management: adequate  Airway patency: patent  Anesthetic complications: No anesthetic complications  PONV Status: none  Cardiovascular status: acceptable  Respiratory status: acceptable  Hydration status: acceptable

## 2021-05-15 LAB
ALBUMIN SERPL-MCNC: 2.8 G/DL (ref 3.5–5.2)
ALBUMIN/GLOB SERPL: 0.9 G/DL
ALP SERPL-CCNC: 105 U/L (ref 39–117)
ALT SERPL W P-5'-P-CCNC: 10 U/L (ref 1–41)
ANION GAP SERPL CALCULATED.3IONS-SCNC: 6 MMOL/L (ref 5–15)
AST SERPL-CCNC: 11 U/L (ref 1–40)
BASOPHILS # BLD AUTO: 0.04 10*3/MM3 (ref 0–0.2)
BASOPHILS NFR BLD AUTO: 0.4 % (ref 0–1.5)
BILIRUB SERPL-MCNC: 0.4 MG/DL (ref 0–1.2)
BUN SERPL-MCNC: 10 MG/DL (ref 8–23)
BUN/CREAT SERPL: 14.7 (ref 7–25)
CALCIUM SPEC-SCNC: 8.2 MG/DL (ref 8.6–10.5)
CHLORIDE SERPL-SCNC: 97 MMOL/L (ref 98–107)
CO2 SERPL-SCNC: 29 MMOL/L (ref 22–29)
CREAT SERPL-MCNC: 0.68 MG/DL (ref 0.76–1.27)
DEPRECATED RDW RBC AUTO: 47.3 FL (ref 37–54)
EOSINOPHIL # BLD AUTO: 0.06 10*3/MM3 (ref 0–0.4)
EOSINOPHIL NFR BLD AUTO: 0.6 % (ref 0.3–6.2)
ERYTHROCYTE [DISTWIDTH] IN BLOOD BY AUTOMATED COUNT: 14.7 % (ref 12.3–15.4)
GFR SERPL CREATININE-BSD FRML MDRD: 117 ML/MIN/1.73
GFR SERPL CREATININE-BSD FRML MDRD: 142 ML/MIN/1.73
GLOBULIN UR ELPH-MCNC: 3.1 GM/DL
GLUCOSE BLDC GLUCOMTR-MCNC: 126 MG/DL (ref 70–130)
GLUCOSE BLDC GLUCOMTR-MCNC: 273 MG/DL (ref 70–130)
GLUCOSE BLDC GLUCOMTR-MCNC: 305 MG/DL (ref 70–130)
GLUCOSE BLDC GLUCOMTR-MCNC: 344 MG/DL (ref 70–130)
GLUCOSE SERPL-MCNC: 390 MG/DL (ref 65–99)
HCT VFR BLD AUTO: 32.5 % (ref 37.5–51)
HGB BLD-MCNC: 11.1 G/DL (ref 13–17.7)
IMM GRANULOCYTES # BLD AUTO: 0.05 10*3/MM3 (ref 0–0.05)
IMM GRANULOCYTES NFR BLD AUTO: 0.5 % (ref 0–0.5)
LYMPHOCYTES # BLD AUTO: 1.61 10*3/MM3 (ref 0.7–3.1)
LYMPHOCYTES NFR BLD AUTO: 15.7 % (ref 19.6–45.3)
MCH RBC QN AUTO: 29.8 PG (ref 26.6–33)
MCHC RBC AUTO-ENTMCNC: 34.2 G/DL (ref 31.5–35.7)
MCV RBC AUTO: 87.1 FL (ref 79–97)
MONOCYTES # BLD AUTO: 1.19 10*3/MM3 (ref 0.1–0.9)
MONOCYTES NFR BLD AUTO: 11.6 % (ref 5–12)
NEUTROPHILS NFR BLD AUTO: 7.29 10*3/MM3 (ref 1.7–7)
NEUTROPHILS NFR BLD AUTO: 71.2 % (ref 42.7–76)
NRBC BLD AUTO-RTO: 0 /100 WBC (ref 0–0.2)
PLATELET # BLD AUTO: 365 10*3/MM3 (ref 140–450)
PMV BLD AUTO: 8.8 FL (ref 6–12)
POTASSIUM SERPL-SCNC: 3.9 MMOL/L (ref 3.5–5.2)
PROT SERPL-MCNC: 5.9 G/DL (ref 6–8.5)
RBC # BLD AUTO: 3.73 10*6/MM3 (ref 4.14–5.8)
SODIUM SERPL-SCNC: 132 MMOL/L (ref 136–145)
WBC # BLD AUTO: 10.24 10*3/MM3 (ref 3.4–10.8)

## 2021-05-15 PROCEDURE — 94760 N-INVAS EAR/PLS OXIMETRY 1: CPT

## 2021-05-15 PROCEDURE — 80053 COMPREHEN METABOLIC PANEL: CPT | Performed by: PODIATRIST

## 2021-05-15 PROCEDURE — 82962 GLUCOSE BLOOD TEST: CPT

## 2021-05-15 PROCEDURE — 63710000001 INSULIN ASPART PER 5 UNITS: Performed by: FAMILY MEDICINE

## 2021-05-15 PROCEDURE — 25010000002 VANCOMYCIN 1 G RECONSTITUTED SOLUTION: Performed by: PODIATRIST

## 2021-05-15 PROCEDURE — 97116 GAIT TRAINING THERAPY: CPT

## 2021-05-15 PROCEDURE — 63710000001 INSULIN ASPART PER 5 UNITS: Performed by: PODIATRIST

## 2021-05-15 PROCEDURE — 25010000002 CEFEPIME PER 500 MG: Performed by: PODIATRIST

## 2021-05-15 PROCEDURE — 85025 COMPLETE CBC W/AUTO DIFF WBC: CPT | Performed by: PODIATRIST

## 2021-05-15 RX ORDER — METFORMIN HYDROCHLORIDE 500 MG/1
500 TABLET, EXTENDED RELEASE ORAL
Status: DISCONTINUED | OUTPATIENT
Start: 2021-05-15 | End: 2021-05-17 | Stop reason: HOSPADM

## 2021-05-15 RX ADMIN — METFORMIN HYDROCHLORIDE 500 MG: 500 TABLET, EXTENDED RELEASE ORAL at 12:28

## 2021-05-15 RX ADMIN — CEFEPIME HYDROCHLORIDE 1 G: 1 INJECTION, POWDER, FOR SOLUTION INTRAMUSCULAR; INTRAVENOUS at 02:16

## 2021-05-15 RX ADMIN — VANCOMYCIN HYDROCHLORIDE 1000 MG: 1 INJECTION, POWDER, LYOPHILIZED, FOR SOLUTION INTRAVENOUS at 15:28

## 2021-05-15 RX ADMIN — SODIUM CHLORIDE, PRESERVATIVE FREE 10 ML: 5 INJECTION INTRAVENOUS at 21:45

## 2021-05-15 RX ADMIN — SODIUM CHLORIDE 75 ML/HR: 900 INJECTION, SOLUTION INTRAVENOUS at 22:28

## 2021-05-15 RX ADMIN — INSULIN ASPART 5 UNITS: 100 INJECTION, SOLUTION INTRAVENOUS; SUBCUTANEOUS at 08:50

## 2021-05-15 RX ADMIN — CEFEPIME HYDROCHLORIDE 1 G: 1 INJECTION, POWDER, FOR SOLUTION INTRAMUSCULAR; INTRAVENOUS at 17:30

## 2021-05-15 RX ADMIN — INSULIN ASPART 7 UNITS: 100 INJECTION, SOLUTION INTRAVENOUS; SUBCUTANEOUS at 12:02

## 2021-05-15 RX ADMIN — SODIUM CHLORIDE 75 ML/HR: 900 INJECTION, SOLUTION INTRAVENOUS at 08:51

## 2021-05-15 RX ADMIN — VANCOMYCIN HYDROCHLORIDE 1000 MG: 1 INJECTION, POWDER, LYOPHILIZED, FOR SOLUTION INTRAVENOUS at 22:28

## 2021-05-15 RX ADMIN — VANCOMYCIN HYDROCHLORIDE 1000 MG: 1 INJECTION, POWDER, LYOPHILIZED, FOR SOLUTION INTRAVENOUS at 06:06

## 2021-05-15 RX ADMIN — MELATONIN 3 MG: at 20:20

## 2021-05-15 RX ADMIN — HYDROCODONE BITARTRATE AND ACETAMINOPHEN 1 TABLET: 7.5; 325 TABLET ORAL at 22:27

## 2021-05-15 RX ADMIN — CEFEPIME HYDROCHLORIDE 1 G: 1 INJECTION, POWDER, FOR SOLUTION INTRAMUSCULAR; INTRAVENOUS at 11:34

## 2021-05-15 NOTE — PROGRESS NOTES
"Pharmacokinetics by Pharmacy - Vancomycin    Jeffry Blanton is a 66 y.o. male receiving vancomycin for skin and soft tissue infection     Patient is also receiving cefepime    Objective:  [Ht: 170.2 cm (67\"); Wt: 53.7 kg (118 lb 4.8 oz)]     WBC   Date Value Ref Range Status   05/15/2021 10.24 3.40 - 10.80 10*3/mm3 Final   05/14/2021 11.24 (H) 3.40 - 10.80 10*3/mm3 Final   05/13/2021 10.74 3.40 - 10.80 10*3/mm3 Final      C-Reactive Protein   Date Value Ref Range Status   05/12/2021 10.41 (H) 0.00 - 0.50 mg/dL Final     Lactate   Date Value Ref Range Status   05/11/2021 1.4 0.5 - 2.0 mmol/L Final      Temp Readings from Last 1 Encounters:   05/15/21 98.2 °F (36.8 °C) (Oral)     Estimated Creatinine Clearance: 83.4 mL/min (A) (by C-G formula based on SCr of 0.68 mg/dL (L)).   Creatinine   Date Value Ref Range Status   05/15/2021 0.68 (L) 0.76 - 1.27 mg/dL Final   05/14/2021 0.56 (L) 0.76 - 1.27 mg/dL Final   05/13/2021 0.45 (L) 0.76 - 1.27 mg/dL Final       Vancomycin Peak   Date Value Ref Range Status   05/14/2021 19.00 (L) 20.00 - 40.00 mcg/mL Final   05/12/2021 15.70 (L) 20.00 - 40.00 mcg/mL Final     Vancomycin Trough   Date Value Ref Range Status   05/14/2021 13.50 5.00 - 20.00 mcg/mL Final   05/13/2021 5.10 5.00 - 20.00 mcg/mL Final       Culture Results:  Microbiology Results (last 10 days)     Procedure Component Value - Date/Time    Wound Culture - Wound, Toe, Right [[id number]] Collected: 05/14/21 0815    Lab Status: Preliminary result Specimen: Wound from Toe, Right Updated: 05/14/21 0930     Gram Stain Rare (1+) WBCs seen      Moderate (3+) Gram positive cocci in pairs    Blood Culture - Blood, Hand, Right [[id number]] Collected: 05/11/21 2105    Lab Status: Preliminary result Specimen: Blood from Hand, Right Updated: 05/14/21 2115     Blood Culture No growth at 3 days    COVID-19 and FLU A/B PCR - Swab, Nasopharynx [[id number]]  (Normal) Collected: 05/11/21 1848    Lab Status: Final result " Specimen: Swab from Nasopharynx Updated: 05/11/21 1954     COVID19 Not Detected     Influenza A PCR Not Detected     Influenza B PCR Not Detected    Narrative:      Fact sheet for providers: https://www.fda.gov/media/972376/download    Fact sheet for patients: https://www.fda.gov/media/582328/download    Test performed by PCR.    Blood Culture - Blood, Arm, Left [[id number]] Collected: 05/11/21 1529    Lab Status: Preliminary result Specimen: Blood from Arm, Left Updated: 05/14/21 1545     Blood Culture No growth at 3 days        No results found for: RESPCX      Assessment:  Patient had surgical intervention yesterday  Following cultures, no results yet  Continue vancomycin with no changes today but anticipate ending abx or adjust based on cultures tomorrow    WBC wnl  Scr appears within expected range  Febrile yesterday    Plan:  1. Continue vancomycin 1000 mg IV Q8H  2. No further levels unless therapy extended or SCr rises  3. Pharmacy will monitor renal function and adjust dose accordingly.      Philip Morgan, Terell   05/15/21 09:03 CDT

## 2021-05-15 NOTE — THERAPY TREATMENT NOTE
Acute Care - Physical Therapy Treatment Note  [facility]     Patient Name: Jeffry Blanton  : 1955  MRN: [id number]  Today's Date: 5/15/2021           PT Assessment (last 12 hours)      PT Evaluation and Treatment     Row Name 05/15/21 1040          Physical Therapy Time and Intention    Subjective Information  no complaints  -RW     Document Type  therapy note (daily note)  -RW     Mode of Treatment  physical therapy  -RW     Patient Effort  good  -RW     Comment  wearing walking boot  -RW     Row Name 05/15/21 1040          General Information    Patient Profile Reviewed  yes  -RW     Existing Precautions/Restrictions  fall  -RW     Row Name 05/15/21 1040          Cognition    Orientation Status (Cognition)  oriented to;person;situation;place  -     Row Name 05/15/21 1040          Pain Scale: Word Pre/Post-Treatment    Pre/Posttreatment Pain Comment  ''alittle''  -     Row Name 05/15/21 1040          Bed Mobility    Supine-Sit [place] (Bed Mobility)  modified independence  -RW     Sit-Supine [place] (Bed Mobility)  modified independence  -RW     Assistive Device (Bed Mobility)  bed rails;head of bed elevated  -RW     Row Name 05/15/21 1040          Transfers    Sit-Stand [place] (Transfers)  standby assist;independent  -RW     Row Name 05/15/21 1040          Gait/Stairs (Locomotion)    [place] Level (Gait)  standby assist  -RW     Assistive Device (Gait)  other (see comments) pt pushing iv pole  -RW     Distance in Feet (Gait)  400+  -RW     Pattern (Gait)  step-through  -RW     Deviations/Abnormal Patterns (Gait)  antalgic  -RW     [place] Level (Stairs)  stand by assist  -RW     Handrail Location (Stairs)  both sides  -RW     Number of Steps (Stairs)  4-6  -RW     Ascending Technique (Stairs)  step-over-step  -RW     Descending Technique (Stairs)  step-over-step  -RW     Row Name 05/15/21 1040          Safety Issues, Functional Mobility    Impairments Affecting  Function (Mobility)  balance  -RW     Row Name 05/15/21 1040          Balance    Static Sitting Balance  WFL  -RW     Row Name             Wound 05/11/21 2009 Right anterior second toe    Wound - Properties Group Placement Date: 05/11/21  -BG Placement Time: 2009 -BG Side: Right  -BG Orientation: anterior  -BG Location: second toe  -BG Stage, Pressure Injury : unstageable  -BG    Retired Wound - Properties Group Date first assessed: 05/11/21  -BG Time first assessed: 2009  -BG Side: Right  -BG Location: second toe  -BG    Row Name             Wound 05/14/21 0839 Right second toe Amputation stump    Wound - Properties Group Placement Date: 05/14/21  -MS Placement Time: 0839  -MS Present on Hospital Admission: N  -MS Side: Right  -MS Location: second toe  -MS Primary Wound Type: Amputation s  -MS    Retired Wound - Properties Group Date first assessed: 05/14/21  -MS Time first assessed: 0839  -MS Present on Hospital Admission: N  -MS Side: Right  -MS Location: second toe  -MS Primary Wound Type: Amputation s  -MS    Row Name 05/15/21 1040          Vital Signs    Pretreatment Heart Rate (beats/min)  82  -RW     Pre SpO2 (%)  98  -RW     O2 Delivery Pre Treatment  room air  -RW     Row Name 05/15/21 1040          Stairs Goal 1 (PT)    Progress/Outcome (Stairs Goal 1, PT)  (S) goal met  -RW     Row Name 05/15/21 1040          Positioning and Restraints    Pre-Treatment Position  in bed  -RW     Post Treatment Position  bed  -RW     In Bed  exit alarm on;encouraged to call for assist;call light within reach  -RW       User Key  (r) = Recorded By, (t) = Taken By, (c) = Cosigned By    Initials Name Provider Type    BG Hector Cervantes RN Registered Nurse    Nirmal Giles PTA Physical Therapy Assistant    Maggie Ho RN Registered Nurse        Physical Therapy Education                 Title: PT OT SLP Therapies (In Progress)     Topic: Physical Therapy (In Progress)     Point: Mobility training (Done)      Learning Progress Summary           Patient Acceptance, E, VU by  at 5/14/2021 1235    Comment: Role of PT, POC, use of gait belt, WBAT                   Point: Home exercise program (Not Started)     Learner Progress:  Not documented in this visit.          Point: Body mechanics (Not Started)     Learner Progress:  Not documented in this visit.          Point: Precautions (Done)     Learning Progress Summary           Patient Acceptance, E, VU by  at 5/14/2021 1235    Comment: Role of PT, POC, use of gait belt, WBAT                               User Key     Initials Effective Dates Name Provider Type Discipline     08/09/20 -  Angeline Dale, PT Physical Therapist PT              PT Recommendation and Plan  Anticipated Discharge Disposition (PT): home with assist  Plan of Care Reviewed With: patient  Progress: improving  Outcome Summary: pt reports ''alittle pain'', in bed wearing walking boot. sba/mod with bed mob and transfers. gt x >400' pushing iv pole sba and stairs 4-6 with breonna hr sba. overall doing well. very pleasant.       Time Calculation:   PT Charges     Row Name 05/15/21 1115             Time Calculation    Start Time  1040  -RW      Stop Time  1057  -RW      Time Calculation (min)  17 min  -RW         Time Calculation- PT    Total Timed Code Minutes- PT  17 minute(s)  -RW         Timed Charges    37937 - Gait Training Minutes   17  -RW         Total Minutes    Timed Charges Total Minutes  17  -RW       Total Minutes  17  -RW        User Key  (r) = Recorded By, (t) = Taken By, (c) = Cosigned By    Initials Name Provider Type     Nirmal Bright PTA Physical Therapy Assistant        Therapy Charges for Today     Code Description Service Date Service Provider Modifiers Qty    [id number] HC GAIT TRAINING EA 15 MIN 5/15/2021 Nirmal Bright PTA GP 1          PT G-Codes  Outcome Measure Options: AM-PAC 6 Clicks Basic Mobility (PT)  AM-PAC 6 Clicks Score (PT): 20  AM-PAC 6 Clicks Score (OT):  24    Nirmal Bright, PTA  5/15/2021

## 2021-05-15 NOTE — PROGRESS NOTES
[facility] Medicine Services  INPATIENT PROGRESS NOTE    Length of Stay: 2  Date of Admission: 5/11/2021  Primary Care Physician: Provider, No Known    Subjective   Chief Complaint: Right foot pain  HPI: Patient doing well, no new acute concerns.  Pain tolerable.  Sugars are little bit elevated.    Review of Systems   Constitutional: Negative for chills and fever.   HENT: Negative for congestion.    Respiratory: Negative for shortness of breath and wheezing.    Cardiovascular: Negative for chest pain.   Gastrointestinal: Negative for abdominal pain, constipation and diarrhea.   Genitourinary: Negative.    Musculoskeletal: Positive for joint swelling and myalgias.   Skin: Positive for color change and wound.   Neurological: Negative.    Psychiatric/Behavioral: Negative.    All other systems reviewed and are negative.     All pertinent negatives and positives are as above. All other systems have been reviewed and are negative unless otherwise stated.     Objective    Temp:  [98.2 °F (36.8 °C)-101.4 °F (38.6 °C)] 98.2 °F (36.8 °C)  Heart Rate:  [70-99] 71  Resp:  [18] 18  BP: (118-155)/(58-70) 130/62    Physical Exam  Constitutional:       General: He is not in acute distress.     Appearance: He is not toxic-appearing.   HENT:      Head: Normocephalic and atraumatic.      Right Ear: External ear normal.      Left Ear: External ear normal.      Nose: Nose normal.      Mouth/Throat:      Mouth: Mucous membranes are moist.      Pharynx: Oropharynx is clear.   Eyes:      Conjunctiva/sclera: Conjunctivae normal.   Cardiovascular:      Rate and Rhythm: Normal rate and regular rhythm.      Pulses: Normal pulses.      Heart sounds: Normal heart sounds.   Pulmonary:      Effort: Pulmonary effort is normal. No respiratory distress.      Breath sounds: Normal breath sounds.   Abdominal:      General: Bowel sounds are normal.      Palpations: Abdomen is soft.      Tenderness: There is no  abdominal tenderness.   Musculoskeletal:         General: No swelling.      Cervical back: Neck supple.   Skin:     General: Skin is warm and dry.      Capillary Refill: Capillary refill takes less than 2 seconds.   Neurological:      General: No focal deficit present.      Mental Status: He is alert and oriented to person, place, and time. Mental status is at baseline.      Coordination: Coordination normal.   Psychiatric:         Mood and Affect: Mood normal.         Behavior: Behavior normal.           Results Review:  I have reviewed the labs, radiology results, and diagnostic studies.    Laboratory Data:   Results from last 7 days   Lab Units 05/15/21  0508 05/14/21  0530 05/13/21  0555   SODIUM mmol/L 132* 135* 130*   POTASSIUM mmol/L 3.9 3.8 3.6   CHLORIDE mmol/L 97* 102 99   CO2 mmol/L 29.0 27.0 24.0   BUN mg/dL 10 8 7*   CREATININE mg/dL 0.68* 0.56* 0.45*   GLUCOSE mg/dL 390* 192* 219*   CALCIUM mg/dL 8.2* 8.6 8.3*   BILIRUBIN mg/dL 0.4 0.7 0.8   ALK PHOS U/L 105 93 91   ALT (SGPT) U/L 10 8 7   AST (SGOT) U/L 11 12 9   ANION GAP mmol/L 6.0 6.0 7.0     Estimated Creatinine Clearance: 83.4 mL/min (A) (by C-G formula based on SCr of 0.68 mg/dL (L)).          Results from last 7 days   Lab Units 05/15/21  0508 05/14/21  0530 05/13/21  0555 05/12/21  0459 05/11/21  1529   WBC 10*3/mm3 10.24 11.24* 10.74 11.64* 11.77*   HEMOGLOBIN g/dL 11.1* 11.3* 11.5* 11.8* 12.3*   HEMATOCRIT % 32.5* 33.9* 33.5* 35.4* 36.8*   PLATELETS 10*3/mm3 365 378 367 398 396           Culture Data:   No results found for: BLOODCX  No results found for: URINECX  No results found for: RESPCX  No results found for: WOUNDCX  No results found for: STOOLCX  No components found for: BODYFLD    Radiology Data:   Imaging Results (Last 24 Hours)     ** No results found for the last 24 hours. **          I have reviewed the patient's current medications.     Assessment/Plan     Active Problems:    Cellulitis of right foot    Type 2 diabetes mellitus  "(CMS/[facility])    Hyponatremia    Cellulitis of left lower extremity    Severe malnutrition (CMS/[facility])    Plan:  -Appreciate podiatry's assistance, status post amputation of his right second toe on 5/14/2021  -PT and OT ordered.  -Continue with IV antibiotics currently with vancomycin and cefepime.  -Continue to monitor cultures and adjust therapy based off of results.  Cultures appear to be growing gram-positive cocci in pairs.  -Continue with glucose checks sliding scale insulin \"we will increase insulin dosing.  -We will start him on a low-dose of Metformin as well to help out with glycemic control.  -DVT prophylaxis with SCDs  -CODE STATUS: Full    I confirmed that the patient's Advance Care Plan is present, code status is documented, or surrogate decision maker is listed in the patient's medical record.     Discharge Planning: In process, possibly tomorrow or Monday.    Tao Gil MD    "

## 2021-05-15 NOTE — PLAN OF CARE
Problem: Skin or Soft Tissue Infection  Goal: Infection Symptom Resolution  Outcome: Ongoing, Progressing   Goal Outcome Evaluation:  Plan of Care Reviewed With: patient  Progress: no change  Outcome Summary: VSS. Patient complained of pain right foot. Relieved with prescribed pain medication. Remains in positive, pleasant mood. Will continue to follow.

## 2021-05-16 LAB
ALBUMIN SERPL-MCNC: 3.4 G/DL (ref 3.5–5.2)
ALBUMIN/GLOB SERPL: 1 G/DL
ALP SERPL-CCNC: 95 U/L (ref 39–117)
ALT SERPL W P-5'-P-CCNC: 9 U/L (ref 1–41)
ANION GAP SERPL CALCULATED.3IONS-SCNC: 9 MMOL/L (ref 5–15)
AST SERPL-CCNC: 10 U/L (ref 1–40)
BACTERIA SPEC AEROBE CULT: ABNORMAL
BACTERIA SPEC AEROBE CULT: NORMAL
BACTERIA SPEC AEROBE CULT: NORMAL
BASOPHILS # BLD AUTO: 0.03 10*3/MM3 (ref 0–0.2)
BASOPHILS NFR BLD AUTO: 0.3 % (ref 0–1.5)
BILIRUB SERPL-MCNC: 0.6 MG/DL (ref 0–1.2)
BUN SERPL-MCNC: 8 MG/DL (ref 8–23)
BUN/CREAT SERPL: 15.7 (ref 7–25)
CALCIUM SPEC-SCNC: 8.8 MG/DL (ref 8.6–10.5)
CHLORIDE SERPL-SCNC: 99 MMOL/L (ref 98–107)
CO2 SERPL-SCNC: 28 MMOL/L (ref 22–29)
CREAT SERPL-MCNC: 0.51 MG/DL (ref 0.76–1.27)
DEPRECATED RDW RBC AUTO: 47.8 FL (ref 37–54)
EOSINOPHIL # BLD AUTO: 0.14 10*3/MM3 (ref 0–0.4)
EOSINOPHIL NFR BLD AUTO: 1.5 % (ref 0.3–6.2)
ERYTHROCYTE [DISTWIDTH] IN BLOOD BY AUTOMATED COUNT: 15 % (ref 12.3–15.4)
GFR SERPL CREATININE-BSD FRML MDRD: >150 ML/MIN/1.73
GFR SERPL CREATININE-BSD FRML MDRD: >150 ML/MIN/1.73
GLOBULIN UR ELPH-MCNC: 3.5 GM/DL
GLUCOSE BLDC GLUCOMTR-MCNC: 165 MG/DL (ref 70–130)
GLUCOSE BLDC GLUCOMTR-MCNC: 206 MG/DL (ref 70–130)
GLUCOSE BLDC GLUCOMTR-MCNC: 276 MG/DL (ref 70–130)
GLUCOSE BLDC GLUCOMTR-MCNC: 378 MG/DL (ref 70–130)
GLUCOSE BLDC GLUCOMTR-MCNC: 387 MG/DL (ref 70–130)
GLUCOSE BLDC GLUCOMTR-MCNC: 392 MG/DL (ref 70–130)
GLUCOSE BLDC GLUCOMTR-MCNC: 407 MG/DL (ref 70–130)
GLUCOSE SERPL-MCNC: 177 MG/DL (ref 65–99)
GRAM STN SPEC: ABNORMAL
GRAM STN SPEC: ABNORMAL
HCT VFR BLD AUTO: 37 % (ref 37.5–51)
HGB BLD-MCNC: 12.6 G/DL (ref 13–17.7)
IMM GRANULOCYTES # BLD AUTO: 0.05 10*3/MM3 (ref 0–0.05)
IMM GRANULOCYTES NFR BLD AUTO: 0.5 % (ref 0–0.5)
LYMPHOCYTES # BLD AUTO: 1.69 10*3/MM3 (ref 0.7–3.1)
LYMPHOCYTES NFR BLD AUTO: 18.5 % (ref 19.6–45.3)
MCH RBC QN AUTO: 30.3 PG (ref 26.6–33)
MCHC RBC AUTO-ENTMCNC: 34.1 G/DL (ref 31.5–35.7)
MCV RBC AUTO: 88.9 FL (ref 79–97)
MONOCYTES # BLD AUTO: 1.06 10*3/MM3 (ref 0.1–0.9)
MONOCYTES NFR BLD AUTO: 11.6 % (ref 5–12)
NEUTROPHILS NFR BLD AUTO: 6.16 10*3/MM3 (ref 1.7–7)
NEUTROPHILS NFR BLD AUTO: 67.6 % (ref 42.7–76)
NRBC BLD AUTO-RTO: 0 /100 WBC (ref 0–0.2)
PLATELET # BLD AUTO: 411 10*3/MM3 (ref 140–450)
PMV BLD AUTO: 8.6 FL (ref 6–12)
POTASSIUM SERPL-SCNC: 3.6 MMOL/L (ref 3.5–5.2)
PROT SERPL-MCNC: 6.9 G/DL (ref 6–8.5)
RBC # BLD AUTO: 4.16 10*6/MM3 (ref 4.14–5.8)
SODIUM SERPL-SCNC: 136 MMOL/L (ref 136–145)
WBC # BLD AUTO: 9.13 10*3/MM3 (ref 3.4–10.8)

## 2021-05-16 PROCEDURE — 94760 N-INVAS EAR/PLS OXIMETRY 1: CPT

## 2021-05-16 PROCEDURE — 25010000002 CEFEPIME PER 500 MG: Performed by: PODIATRIST

## 2021-05-16 PROCEDURE — 85025 COMPLETE CBC W/AUTO DIFF WBC: CPT | Performed by: PODIATRIST

## 2021-05-16 PROCEDURE — 25010000002 VANCOMYCIN 1 G RECONSTITUTED SOLUTION: Performed by: PODIATRIST

## 2021-05-16 PROCEDURE — 25010000002 CEFTRIAXONE PER 250 MG: Performed by: FAMILY MEDICINE

## 2021-05-16 PROCEDURE — 97116 GAIT TRAINING THERAPY: CPT

## 2021-05-16 PROCEDURE — 82962 GLUCOSE BLOOD TEST: CPT

## 2021-05-16 PROCEDURE — 63710000001 INSULIN ASPART PER 5 UNITS: Performed by: FAMILY MEDICINE

## 2021-05-16 PROCEDURE — 80053 COMPREHEN METABOLIC PANEL: CPT | Performed by: PODIATRIST

## 2021-05-16 RX ADMIN — SODIUM CHLORIDE, PRESERVATIVE FREE 10 ML: 5 INJECTION INTRAVENOUS at 20:30

## 2021-05-16 RX ADMIN — VANCOMYCIN HYDROCHLORIDE 1000 MG: 1 INJECTION, POWDER, LYOPHILIZED, FOR SOLUTION INTRAVENOUS at 06:27

## 2021-05-16 RX ADMIN — CEFTRIAXONE SODIUM 2 G: 2 INJECTION, POWDER, FOR SOLUTION INTRAMUSCULAR; INTRAVENOUS at 17:46

## 2021-05-16 RX ADMIN — INSULIN ASPART 8 UNITS: 100 INJECTION, SOLUTION INTRAVENOUS; SUBCUTANEOUS at 11:35

## 2021-05-16 RX ADMIN — CEFEPIME HYDROCHLORIDE 1 G: 1 INJECTION, POWDER, FOR SOLUTION INTRAMUSCULAR; INTRAVENOUS at 10:06

## 2021-05-16 RX ADMIN — METFORMIN HYDROCHLORIDE 500 MG: 500 TABLET, EXTENDED RELEASE ORAL at 08:51

## 2021-05-16 RX ADMIN — MELATONIN 3 MG: at 20:30

## 2021-05-16 RX ADMIN — CEFEPIME HYDROCHLORIDE 1 G: 1 INJECTION, POWDER, FOR SOLUTION INTRAMUSCULAR; INTRAVENOUS at 02:10

## 2021-05-16 RX ADMIN — INSULIN ASPART 2 UNITS: 100 INJECTION, SOLUTION INTRAVENOUS; SUBCUTANEOUS at 08:51

## 2021-05-16 RX ADMIN — INSULIN ASPART 6 UNITS: 100 INJECTION, SOLUTION INTRAVENOUS; SUBCUTANEOUS at 17:46

## 2021-05-16 NOTE — THERAPY TREATMENT NOTE
Acute Care - Physical Therapy Treatment Note  [facility]     Patient Name: Jeffry Blanton  : 1955  MRN: [id number]  Today's Date: 2021           PT Assessment (last 12 hours)      PT Evaluation and Treatment     Row Name 21 1027          Physical Therapy Time and Intention    Subjective Information  no complaints  -RW     Document Type  therapy note (daily note)  -RW     Mode of Treatment  physical therapy  -RW     Patient Effort  good  -RW     Comment  wearing walking boot  -RW     Row Name 21 1027          General Information    Patient Profile Reviewed  yes  -RW     Existing Precautions/Restrictions  fall  -RW     Row Name 21 1027          Cognition    Orientation Status (Cognition)  oriented to;person;situation;place  -     Row Name 21 1027          Pain Scale: Word Pre/Post-Treatment    Pre/Posttreatment Pain Comment  ''little pain''  -     Row Name 21 1027          Bed Mobility    Supine-Sit [place] (Bed Mobility)  modified independence  -RW     Sit-Supine [place] (Bed Mobility)  modified independence  -RW     Assistive Device (Bed Mobility)  bed rails;head of bed elevated  -RW     Row Name 21 1027          Transfers    Sit-Stand [place] (Transfers)  standby assist;independent  -RW     Row Name 21 1027          Gait/Stairs (Locomotion)    [place] Level (Gait)  standby assist;modified independence  -RW     Assistive Device (Gait)  other (see comments) pt pushing iv pole  -RW     Distance in Feet (Gait)  640'  -RW     Pattern (Gait)  step-through  -RW     Deviations/Abnormal Patterns (Gait)  antalgic  -RW     Right Sided Gait Deviations  -- wearing boot on right  -RW     Row Name 21 1027          Safety Issues, Functional Mobility    Comment, Safety Issues/Impairments (Mobility)  none  -RW     Row Name 21 1027          Balance    Static Sitting Balance  WFL  -RW     Row Name             Wound 21  Right anterior second toe    Wound - Properties Group Placement Date: 05/11/21  -BG Placement Time: 2009 -BG Side: Right  -BG Orientation: anterior  -BG Location: second toe  -BG Stage, Pressure Injury : unstageable  -BG    Retired Wound - Properties Group Date first assessed: 05/11/21  -BG Time first assessed: 2009  -BG Side: Right  -BG Location: second toe  -BG    Row Name             Wound 05/14/21 0839 Right second toe Amputation stump    Wound - Properties Group Placement Date: 05/14/21  -MS Placement Time: 0839  -MS Present on Hospital Admission: N  -MS Side: Right  -MS Location: second toe  -MS Primary Wound Type: Amputation s  -MS    Retired Wound - Properties Group Date first assessed: 05/14/21  -MS Time first assessed: 0839  -MS Present on Hospital Admission: N  -MS Side: Right  -MS Location: second toe  -MS Primary Wound Type: Amputation s  -MS    Row Name 05/16/21 1027          Transfer Goal 1 (PT)    Activity/Assistive Device (Transfer Goal 1, PT)  sit-to-stand/stand-to-sit;bed-to-chair/chair-to-bed  -RW     [place] Level/Cues Needed (Transfer Goal 1, PT)  independent  -RW     Time Frame (Transfer Goal 1, PT)  2 days  -RW     Progress/Outcome (Transfer Goal 1, PT)  (S) goal met  -RW     Row Name 05/16/21 1027          Gait Training Goal 1 (PT)    Activity/Assistive Device (Gait Training Goal 1, PT)  gait (walking locomotion);decrease fall risk  -RW     [place] Level (Gait Training Goal 1, PT)  independent  -RW     Distance (Gait Training Goal 1, PT)  500ft or more  -RW     Time Frame (Gait Training Goal 1, PT)  3 days  -RW     Progress/Outcome (Gait Training Goal 1, PT)  goal partially met  -RW     Row Name 05/16/21 1027          Stairs Goal 1 (PT)    Activity/Assistive Device (Stairs Goal 1, PT)  ascending stairs;descending stairs;using handrail, left;using handrail, right  -RW     [place] Level/Cues Needed (Stairs Goal 1, PT)  supervision required  -RW     Number of Stairs (Stairs Goal  1, PT)  2 or more  -RW     Time Frame (Stairs Goal 1, PT)  by discharge  -RW     Progress/Outcome (Stairs Goal 1, PT)  (S) goal met  -RW     Row Name 05/16/21 1027          Positioning and Restraints    Pre-Treatment Position  in bed  -RW     Post Treatment Position  chair  -RW     In Chair  call light within reach;encouraged to call for assist  -RW       User Key  (r) = Recorded By, (t) = Taken By, (c) = Cosigned By    Initials Name Provider Type     Hector Cervantes, RN Registered Nurse    Nirmal Giles PTA Physical Therapy Assistant    Maggie Ho RN Registered Nurse        Physical Therapy Education                 Title: PT OT SLP Therapies (In Progress)     Topic: Physical Therapy (In Progress)     Point: Mobility training (Done)     Learning Progress Summary           Patient Acceptance, E, VU by  at 5/14/2021 1235    Comment: Role of PT, POC, use of gait belt, WBAT                   Point: Home exercise program (Not Started)     Learner Progress:  Not documented in this visit.          Point: Body mechanics (Not Started)     Learner Progress:  Not documented in this visit.          Point: Precautions (Done)     Learning Progress Summary           Patient Acceptance, E, VU by  at 5/14/2021 1235    Comment: Role of PT, POC, use of gait belt, WBAT                               User Key     Initials Effective Dates Name Provider Type Discipline     08/09/20 -  Angeline Dale, PT Physical Therapist PT              PT Recommendation and Plan  Anticipated Discharge Disposition (PT): home with assist  Plan of Care Reviewed With: patient  Progress: improving  Outcome Summary: pt ind with transfers and sba/mod ind with gt pushing iv pole. fast paced gt with boot on right. gt x 640'. pt up in recliner.probable dc tomorrow.       Time Calculation:   PT Charges     Row Name 05/16/21 1243             Time Calculation    Start Time  1027  -RW      Stop Time  1042  -RW      Time Calculation (min)  15 min   -RW         Time Calculation- PT    Total Timed Code Minutes- PT  15 minute(s)  -RW         Timed Charges    95552 - Gait Training Minutes   15  -RW         Total Minutes    Timed Charges Total Minutes  15  -RW       Total Minutes  15  -RW        User Key  (r) = Recorded By, (t) = Taken By, (c) = Cosigned By    Initials Name Provider Type    Nirmal Giles PTA Physical Therapy Assistant        Therapy Charges for Today     Code Description Service Date Service Provider Modifiers Qty    99465811404 HC GAIT TRAINING EA 15 MIN 5/15/2021 Nirmal Bright PTA GP 1    [id number] HC GAIT TRAINING EA 15 MIN 5/16/2021 Nirmal Bright, DARIO GP 1          PT G-Codes  Outcome Measure Options: AM-PAC 6 Clicks Basic Mobility (PT)  AM-PAC 6 Clicks Score (PT): 20  AM-PAC 6 Clicks Score (OT): 24    Nirmal Bright PTA  5/16/2021

## 2021-05-16 NOTE — PLAN OF CARE
"  Problem: Skin or Soft Tissue Infection  Goal: Infection Symptom Resolution  Outcome: Ongoing, Progressing   Goal Outcome Evaluation:  Plan of Care Reviewed With: patient  Progress: no change  Outcome Summary: VSS. Have been able to communicate some with patient in Pashto--simple phrases. States, \"Pain is not bad in right foot now\" when does not want pain medication. States, \"Little pain\" when needs pain medication. Has rested well this shift. Will continue to follow.  "

## 2021-05-16 NOTE — PROGRESS NOTES
[facility] Medicine Services  INPATIENT PROGRESS NOTE    Length of Stay: 3  Date of Admission: 5/11/2021  Primary Care Physician: Provider, No Known    Subjective   Chief Complaint: Right foot pain  HPI: Doing well, pain controlled.  No new concerns.     Review of Systems   Constitutional: Negative for chills and fever.   HENT: Negative for congestion.    Respiratory: Negative for shortness of breath and wheezing.    Cardiovascular: Negative for chest pain.   Gastrointestinal: Negative for abdominal pain, constipation and diarrhea.   Genitourinary: Negative.    Musculoskeletal: Positive for joint swelling and myalgias.   Skin: Positive for color change and wound.   Neurological: Negative.    Psychiatric/Behavioral: Negative.    All other systems reviewed and are negative.     All pertinent negatives and positives are as above. All other systems have been reviewed and are negative unless otherwise stated.     Objective    Temp:  [97.7 °F (36.5 °C)-99.2 °F (37.3 °C)] 97.7 °F (36.5 °C)  Heart Rate:  [68-81] 68  Resp:  [18] 18  BP: (153-168)/(54-77) 161/54    Physical Exam  Constitutional:       General: He is not in acute distress.     Appearance: He is not toxic-appearing.   HENT:      Head: Normocephalic and atraumatic.      Right Ear: External ear normal.      Left Ear: External ear normal.      Nose: Nose normal.      Mouth/Throat:      Mouth: Mucous membranes are moist.      Pharynx: Oropharynx is clear.   Eyes:      Conjunctiva/sclera: Conjunctivae normal.   Cardiovascular:      Rate and Rhythm: Normal rate and regular rhythm.      Pulses: Normal pulses.      Heart sounds: Normal heart sounds.   Pulmonary:      Effort: Pulmonary effort is normal. No respiratory distress.      Breath sounds: Normal breath sounds.   Abdominal:      General: Bowel sounds are normal.      Palpations: Abdomen is soft.      Tenderness: There is no abdominal tenderness.   Musculoskeletal:          General: No swelling.      Cervical back: Neck supple.   Skin:     General: Skin is warm and dry.      Capillary Refill: Capillary refill takes less than 2 seconds.   Neurological:      General: No focal deficit present.      Mental Status: He is alert and oriented to person, place, and time. Mental status is at baseline.      Coordination: Coordination normal.   Psychiatric:         Mood and Affect: Mood normal.         Behavior: Behavior normal.           Results Review:  I have reviewed the labs, radiology results, and diagnostic studies.    Laboratory Data:   Results from last 7 days   Lab Units 05/16/21  0544 05/15/21  0508 05/14/21  0530   SODIUM mmol/L 136 132* 135*   POTASSIUM mmol/L 3.6 3.9 3.8   CHLORIDE mmol/L 99 97* 102   CO2 mmol/L 28.0 29.0 27.0   BUN mg/dL 8 10 8   CREATININE mg/dL 0.51* 0.68* 0.56*   GLUCOSE mg/dL 177* 390* 192*   CALCIUM mg/dL 8.8 8.2* 8.6   BILIRUBIN mg/dL 0.6 0.4 0.7   ALK PHOS U/L 95 105 93   ALT (SGPT) U/L 9 10 8   AST (SGOT) U/L 10 11 12   ANION GAP mmol/L 9.0 6.0 6.0     Estimated Creatinine Clearance: 112 mL/min (A) (by C-G formula based on SCr of 0.51 mg/dL (L)).          Results from last 7 days   Lab Units 05/16/21  0544 05/15/21  0508 05/14/21  0530 05/13/21  0555 05/12/21  0459   WBC 10*3/mm3 9.13 10.24 11.24* 10.74 11.64*   HEMOGLOBIN g/dL 12.6* 11.1* 11.3* 11.5* 11.8*   HEMATOCRIT % 37.0* 32.5* 33.9* 33.5* 35.4*   PLATELETS 10*3/mm3 411 365 378 367 398           Culture Data:   No results found for: BLOODCX  No results found for: URINECX  No results found for: RESPCX  Wound Culture   Date Value Ref Range Status   05/14/2021 Light growth (2+) Staphylococcus aureus (A)  Final     No results found for: STOOLCX  No components found for: BODYFLD    Radiology Data:   Imaging Results (Last 24 Hours)     ** No results found for the last 24 hours. **          I have reviewed the patient's current medications.     Assessment/Plan     Active Problems:    Cellulitis of right  "foot    Type 2 diabetes mellitus (CMS/HCC)    Hyponatremia    Cellulitis of left lower extremity    Severe malnutrition (CMS/[facility])    Plan:  -Appreciate podiatry's assistance, status post amputation of his right second toe on 5/14/2021  -PT and OT ordered.  -Continue with IV antibiotics, change to Rocephin today and monitor.  -Continue to monitor cultures and adjust therapy based off of results.  Cultures appear to be growing gram-positive cocci in pairs.  -Continue with glucose checks sliding scale insulin \"we will increase insulin dosing.  -We will start him on a low-dose of Metformin as well to help out with glycemic control.  -DVT prophylaxis with SCDs  -CODE STATUS: Full    I confirmed that the patient's Advance Care Plan is present, code status is documented, or surrogate decision maker is listed in the patient's medical record.     Discharge Planning: In process, likely Monday on PO Augmentin.     Tao Gil MD    "

## 2021-05-16 NOTE — PROGRESS NOTES
"Pharmacokinetics by Pharmacy - Vancomycin    Jeffry Blanton is a 66 y.o. male receiving vancomycin for skin and soft tissue infection     Patient is also receiving cefepime    Objective:  [Ht: 170.2 cm (67\"); Wt: 54.1 kg (119 lb 4.8 oz)]     WBC   Date Value Ref Range Status   05/16/2021 9.13 3.40 - 10.80 10*3/mm3 Final   05/15/2021 10.24 3.40 - 10.80 10*3/mm3 Final   05/14/2021 11.24 (H) 3.40 - 10.80 10*3/mm3 Final      C-Reactive Protein   Date Value Ref Range Status   05/12/2021 10.41 (H) 0.00 - 0.50 mg/dL Final     Lactate   Date Value Ref Range Status   05/11/2021 1.4 0.5 - 2.0 mmol/L Final      Temp Readings from Last 1 Encounters:   05/16/21 97.7 °F (36.5 °C) (Oral)     Estimated Creatinine Clearance: 112 mL/min (A) (by C-G formula based on SCr of 0.51 mg/dL (L)).   Creatinine   Date Value Ref Range Status   05/16/2021 0.51 (L) 0.76 - 1.27 mg/dL Final   05/15/2021 0.68 (L) 0.76 - 1.27 mg/dL Final   05/14/2021 0.56 (L) 0.76 - 1.27 mg/dL Final       Vancomycin Peak   Date Value Ref Range Status   05/14/2021 19.00 (L) 20.00 - 40.00 mcg/mL Final   05/12/2021 15.70 (L) 20.00 - 40.00 mcg/mL Final     Vancomycin Trough   Date Value Ref Range Status   05/14/2021 13.50 5.00 - 20.00 mcg/mL Final   05/13/2021 5.10 5.00 - 20.00 mcg/mL Final       Culture Results:  Microbiology Results (last 10 days)     Procedure Component Value - Date/Time    Wound Culture - Wound, Toe, Right [[id number]]  (Abnormal) Collected: 05/14/21 0815    Lab Status: Preliminary result Specimen: Wound from Toe, Right Updated: 05/15/21 1324     Wound Culture Light growth (2+) Staphylococcus aureus     Gram Stain Rare (1+) WBCs seen      Moderate (3+) Gram positive cocci in pairs    Blood Culture - Blood, Hand, Right [[id number]] Collected: 05/11/21 2105    Lab Status: Preliminary result Specimen: Blood from Hand, Right Updated: 05/15/21 2115     Blood Culture No growth at 4 days    COVID-19 and FLU A/B PCR - Swab, Nasopharynx [[id number]]  " (Normal) Collected: 05/11/21 1848    Lab Status: Final result Specimen: Swab from Nasopharynx Updated: 05/11/21 1954     COVID19 Not Detected     Influenza A PCR Not Detected     Influenza B PCR Not Detected    Narrative:      Fact sheet for providers: https://www.fda.gov/media/425874/download    Fact sheet for patients: https://www.fda.gov/media/824073/download    Test performed by PCR.    Blood Culture - Blood, Arm, Left [[id number]] Collected: 05/11/21 1529    Lab Status: Preliminary result Specimen: Blood from Arm, Left Updated: 05/15/21 1545     Blood Culture No growth at 4 days        No results found for: RESPCX      Assessment:  Cultures currently showing staph aureus with no sensitivities yet  Scr stable  WBC WNL  Afebrile  No levels needed at this time  We will follow the culture for sensitivities and adjust from there  No changes at this point    Plan:  1. Continue vancomycin 1000 mg IV Q8H  2. No further levels unless therapy extended or SCr rises  3. Pharmacy will monitor renal function and adjust dose accordingly.      Philip Morgan, PharmD   05/16/21 08:32 CDT

## 2021-05-16 NOTE — PLAN OF CARE
Goal Outcome Evaluation:  Plan of Care Reviewed With: patient  Progress: improving  Outcome Summary: pt ind with transfers and sba/mod ind with gt pushing iv pole. fast paced gt with boot on right. gt x 640'. pt up in recliner.probable dc tomorrow.

## 2021-05-17 ENCOUNTER — READMISSION MANAGEMENT (OUTPATIENT)
Dept: CALL CENTER | Facility: HOSPITAL | Age: 66
End: 2021-05-17

## 2021-05-17 VITALS
TEMPERATURE: 99.2 F | DIASTOLIC BLOOD PRESSURE: 57 MMHG | HEIGHT: 67 IN | BODY MASS INDEX: 19.71 KG/M2 | SYSTOLIC BLOOD PRESSURE: 122 MMHG | RESPIRATION RATE: 18 BRPM | WEIGHT: 125.6 LBS | OXYGEN SATURATION: 96 % | HEART RATE: 76 BPM

## 2021-05-17 LAB
ALBUMIN SERPL-MCNC: 3.1 G/DL (ref 3.5–5.2)
ALBUMIN/GLOB SERPL: 0.8 G/DL
ALP SERPL-CCNC: 109 U/L (ref 39–117)
ALT SERPL W P-5'-P-CCNC: 12 U/L (ref 1–41)
ANION GAP SERPL CALCULATED.3IONS-SCNC: 8 MMOL/L (ref 5–15)
AST SERPL-CCNC: 11 U/L (ref 1–40)
BASOPHILS # BLD AUTO: 0.04 10*3/MM3 (ref 0–0.2)
BASOPHILS NFR BLD AUTO: 0.5 % (ref 0–1.5)
BILIRUB SERPL-MCNC: 0.4 MG/DL (ref 0–1.2)
BUN SERPL-MCNC: 9 MG/DL (ref 8–23)
BUN/CREAT SERPL: 16.1 (ref 7–25)
CALCIUM SPEC-SCNC: 8.8 MG/DL (ref 8.6–10.5)
CHLORIDE SERPL-SCNC: 99 MMOL/L (ref 98–107)
CO2 SERPL-SCNC: 28 MMOL/L (ref 22–29)
CREAT SERPL-MCNC: 0.56 MG/DL (ref 0.76–1.27)
DEPRECATED RDW RBC AUTO: 45.5 FL (ref 37–54)
EOSINOPHIL # BLD AUTO: 0.13 10*3/MM3 (ref 0–0.4)
EOSINOPHIL NFR BLD AUTO: 1.6 % (ref 0.3–6.2)
ERYTHROCYTE [DISTWIDTH] IN BLOOD BY AUTOMATED COUNT: 14.4 % (ref 12.3–15.4)
GFR SERPL CREATININE-BSD FRML MDRD: 147 ML/MIN/1.73
GFR SERPL CREATININE-BSD FRML MDRD: >150 ML/MIN/1.73
GLOBULIN UR ELPH-MCNC: 3.8 GM/DL
GLUCOSE BLDC GLUCOMTR-MCNC: 210 MG/DL (ref 70–130)
GLUCOSE BLDC GLUCOMTR-MCNC: 268 MG/DL (ref 70–130)
GLUCOSE SERPL-MCNC: 247 MG/DL (ref 65–99)
HCT VFR BLD AUTO: 35.5 % (ref 37.5–51)
HGB BLD-MCNC: 11.7 G/DL (ref 13–17.7)
IMM GRANULOCYTES # BLD AUTO: 0.05 10*3/MM3 (ref 0–0.05)
IMM GRANULOCYTES NFR BLD AUTO: 0.6 % (ref 0–0.5)
LYMPHOCYTES # BLD AUTO: 2.22 10*3/MM3 (ref 0.7–3.1)
LYMPHOCYTES NFR BLD AUTO: 26.6 % (ref 19.6–45.3)
MCH RBC QN AUTO: 28.7 PG (ref 26.6–33)
MCHC RBC AUTO-ENTMCNC: 33 G/DL (ref 31.5–35.7)
MCV RBC AUTO: 87 FL (ref 79–97)
MONOCYTES # BLD AUTO: 0.98 10*3/MM3 (ref 0.1–0.9)
MONOCYTES NFR BLD AUTO: 11.7 % (ref 5–12)
NEUTROPHILS NFR BLD AUTO: 4.93 10*3/MM3 (ref 1.7–7)
NEUTROPHILS NFR BLD AUTO: 59 % (ref 42.7–76)
NRBC BLD AUTO-RTO: 0 /100 WBC (ref 0–0.2)
PLATELET # BLD AUTO: 443 10*3/MM3 (ref 140–450)
PMV BLD AUTO: 8.6 FL (ref 6–12)
POTASSIUM SERPL-SCNC: 3.9 MMOL/L (ref 3.5–5.2)
PROT SERPL-MCNC: 6.9 G/DL (ref 6–8.5)
RBC # BLD AUTO: 4.08 10*6/MM3 (ref 4.14–5.8)
SODIUM SERPL-SCNC: 135 MMOL/L (ref 136–145)
WBC # BLD AUTO: 8.35 10*3/MM3 (ref 3.4–10.8)

## 2021-05-17 PROCEDURE — 80053 COMPREHEN METABOLIC PANEL: CPT | Performed by: PODIATRIST

## 2021-05-17 PROCEDURE — 63710000001 INSULIN ASPART PER 5 UNITS: Performed by: FAMILY MEDICINE

## 2021-05-17 PROCEDURE — 85025 COMPLETE CBC W/AUTO DIFF WBC: CPT | Performed by: PODIATRIST

## 2021-05-17 PROCEDURE — 97116 GAIT TRAINING THERAPY: CPT

## 2021-05-17 PROCEDURE — 82962 GLUCOSE BLOOD TEST: CPT

## 2021-05-17 RX ORDER — METFORMIN HYDROCHLORIDE 500 MG/1
1000 TABLET, EXTENDED RELEASE ORAL
Qty: 60 TABLET | Refills: 0 | Status: SHIPPED | OUTPATIENT
Start: 2021-05-17 | End: 2021-05-26 | Stop reason: SDUPTHER

## 2021-05-17 RX ORDER — AMOXICILLIN AND CLAVULANATE POTASSIUM 875; 125 MG/1; MG/1
1 TABLET, FILM COATED ORAL 2 TIMES DAILY
Qty: 28 TABLET | Refills: 0 | Status: SHIPPED | OUTPATIENT
Start: 2021-05-17 | End: 2021-05-31

## 2021-05-17 RX ORDER — HYDROCODONE BITARTRATE AND ACETAMINOPHEN 7.5; 325 MG/1; MG/1
1 TABLET ORAL EVERY 6 HOURS PRN
Qty: 9 TABLET | Refills: 0 | Status: SHIPPED | OUTPATIENT
Start: 2021-05-17 | End: 2021-05-20

## 2021-05-17 RX ADMIN — METFORMIN HYDROCHLORIDE 500 MG: 500 TABLET, EXTENDED RELEASE ORAL at 08:33

## 2021-05-17 RX ADMIN — SODIUM CHLORIDE, PRESERVATIVE FREE 10 ML: 5 INJECTION INTRAVENOUS at 08:34

## 2021-05-17 RX ADMIN — INSULIN ASPART 4 UNITS: 100 INJECTION, SOLUTION INTRAVENOUS; SUBCUTANEOUS at 08:33

## 2021-05-17 NOTE — PLAN OF CARE
Goal Outcome Evaluation:  Plan of Care Reviewed With: patient  Progress: improving  Outcome Summary: pt ind with transfers and gt.pt understands wearing boot for amb as verified by translater. ready for dc home

## 2021-05-17 NOTE — DISCHARGE SUMMARY
[facility] Medicine Services  DISCHARGE SUMMARY       Date of Admission: 5/11/2021  Date of Discharge:  5/17/2021  Primary Care Physician: Provider, No Known    Presenting Problem/History of Present Illness:  Hyperglycemia [R73.9]  Cellulitis of right foot [L03.115]  Cellulitis of left lower extremity [L03.116]  Open nondisplaced fracture of phalanx of toe of left foot, unspecified toe, initial encounter [S92.092B]     Final Discharge Diagnoses:  Active Hospital Problems    Diagnosis    • Severe malnutrition (CMS/HCC)    • Cellulitis of left lower extremity    • Cellulitis of right foot    • Type 2 diabetes mellitus (CMS/HCC)    • Hyponatremia        Consults:   Consults     Date and Time Order Name Status Description    5/11/2021  7:10 PM Inpatient Podiatry Consult      5/11/2021  5:53 PM Foot / Ankle (on-call MD unless specified) Completed     5/11/2021  5:52 PM Hospitalist (on-call MD unless specified)            Procedures Performed: Procedure(s):  AMPUTATION SECOND DIGIT RIGHT                Pertinent Test Results:   Lab Results (most recent)     Procedure Component Value Units Date/Time    POC Glucose Once [[id number]]  (Abnormal) Collected: 05/17/21 0638    Specimen: Blood Updated: 05/17/21 0708     Glucose 210 mg/dL      Comment: RN NotifiedOperator: [id number] OMAR ELRITAMeter ID: [id number]       Comprehensive Metabolic Panel [[id number]]  (Abnormal) Collected: 05/17/21 0526    Specimen: Blood Updated: 05/17/21 0614     Glucose 247 mg/dL      BUN 9 mg/dL      Creatinine 0.56 mg/dL      Sodium 135 mmol/L      Potassium 3.9 mmol/L      Chloride 99 mmol/L      CO2 28.0 mmol/L      Calcium 8.8 mg/dL      Total Protein 6.9 g/dL      Albumin 3.10 g/dL      ALT (SGPT) 12 U/L      AST (SGOT) 11 U/L      Alkaline Phosphatase 109 U/L      Total Bilirubin 0.4 mg/dL      eGFR Non African Amer 147 mL/min/1.73      eGFR  African Amer >150 mL/min/1.73      Globulin 3.8 gm/dL       A/G Ratio 0.8 g/dL      BUN/Creatinine Ratio 16.1     Anion Gap 8.0 mmol/L     Narrative:      GFR Normal >60  Chronic Kidney Disease <60  Kidney Failure <15      CBC Auto Differential [[id number]]  (Abnormal) Collected: 05/17/21 0526    Specimen: Blood Updated: 05/17/21 0541     WBC 8.35 10*3/mm3      RBC 4.08 10*6/mm3      Hemoglobin 11.7 g/dL      Hematocrit 35.5 %      MCV 87.0 fL      MCH 28.7 pg      MCHC 33.0 g/dL      RDW 14.4 %      RDW-SD 45.5 fl      MPV 8.6 fL      Platelets 443 10*3/mm3      Neutrophil % 59.0 %      Lymphocyte % 26.6 %      Monocyte % 11.7 %      Eosinophil % 1.6 %      Basophil % 0.5 %      Immature Grans % 0.6 %      Neutrophils, Absolute 4.93 10*3/mm3      Lymphocytes, Absolute 2.22 10*3/mm3      Monocytes, Absolute 0.98 10*3/mm3      Eosinophils, Absolute 0.13 10*3/mm3      Basophils, Absolute 0.04 10*3/mm3      Immature Grans, Absolute 0.05 10*3/mm3      nRBC 0.0 /100 WBC     POC Glucose Once [[id number]]  (Abnormal) Collected: 05/16/21 2027    Specimen: Blood Updated: 05/16/21 2123     Glucose 206 mg/dL      Comment: RN NotifiedOperator: [id number] OMAR ELRITAMeter ID: [id number]       Blood Culture - Blood, Hand, Right [[id number]] Collected: 05/11/21 2105    Specimen: Blood from Hand, Right Updated: 05/16/21 2115     Blood Culture No growth at 5 days    Blood Culture - Blood, Arm, Left [[id number]] Collected: 05/11/21 1529    Specimen: Blood from Arm, Left Updated: 05/16/21 1545     Blood Culture No growth at 5 days    Wound Culture - Wound, Toe, Right [[id number]]  (Abnormal)  (Susceptibility) Collected: 05/14/21 0815    Specimen: Wound from Toe, Right Updated: 05/16/21 0932     Wound Culture Light growth (2+) Staphylococcus aureus     Gram Stain Rare (1+) WBCs seen      Moderate (3+) Gram positive cocci in pairs    Susceptibility      Staphylococcus aureus      FELICITAS      Clindamycin Susceptible      Erythromycin Susceptible      Inducible Clindamycin Resistance Negative       Oxacillin Susceptible      Penicillin G Resistant      Rifampin Susceptible      Tetracycline Susceptible      Trimethoprim + Sulfamethoxazole Susceptible      Vancomycin Susceptible               Linear View               Susceptibility Comments     Staphylococcus aureus    This isolate does not demonstrate inducible clindamycin resistance in vitro.               Comprehensive Metabolic Panel [[id number]]  (Abnormal) Collected: 05/16/21 0544    Specimen: Blood Updated: 05/16/21 0659     Glucose 177 mg/dL      BUN 8 mg/dL      Creatinine 0.51 mg/dL      Sodium 136 mmol/L      Potassium 3.6 mmol/L      Chloride 99 mmol/L      CO2 28.0 mmol/L      Calcium 8.8 mg/dL      Total Protein 6.9 g/dL      Albumin 3.40 g/dL      ALT (SGPT) 9 U/L      AST (SGOT) 10 U/L      Alkaline Phosphatase 95 U/L      Total Bilirubin 0.6 mg/dL      eGFR Non African Amer >150 mL/min/1.73      eGFR  African Amer >150 mL/min/1.73      Globulin 3.5 gm/dL      A/G Ratio 1.0 g/dL      BUN/Creatinine Ratio 15.7     Anion Gap 9.0 mmol/L     Narrative:      GFR Normal >60  Chronic Kidney Disease <60  Kidney Failure <15      CBC Auto Differential [[id number]]  (Abnormal) Collected: 05/16/21 0544    Specimen: Blood Updated: 05/16/21 0620     WBC 9.13 10*3/mm3      RBC 4.16 10*6/mm3      Hemoglobin 12.6 g/dL      Hematocrit 37.0 %      MCV 88.9 fL      MCH 30.3 pg      MCHC 34.1 g/dL      RDW 15.0 %      RDW-SD 47.8 fl      MPV 8.6 fL      Platelets 411 10*3/mm3      Neutrophil % 67.6 %      Lymphocyte % 18.5 %      Monocyte % 11.6 %      Eosinophil % 1.5 %      Basophil % 0.3 %      Immature Grans % 0.5 %      Neutrophils, Absolute 6.16 10*3/mm3      Lymphocytes, Absolute 1.69 10*3/mm3      Monocytes, Absolute 1.06 10*3/mm3      Eosinophils, Absolute 0.14 10*3/mm3      Basophils, Absolute 0.03 10*3/mm3      Immature Grans, Absolute 0.05 10*3/mm3      nRBC 0.0 /100 WBC     Vancomycin, Trough 30 minutes before vancomycin infusion please [[id number]]   (Normal) Collected: 05/14/21 1400    Specimen: Blood Updated: 05/14/21 1547     Vancomycin Trough 13.50 mcg/mL     Vancomycin, Peak 1 hour after vancomycin infusion please [[id number]]  (Abnormal) Collected: 05/14/21 1007    Specimen: Blood Updated: 05/14/21 1044     Vancomycin Peak 19.00 mcg/mL     Tissue Pathology Exam [[id number]] Collected: 05/14/21 0825    Specimen: Bone from Toe, Right Updated: 05/14/21 0855    Vancomycin, Trough [[id number]]  (Normal) Collected: 05/13/21 0555    Specimen: Blood Updated: 05/13/21 0626     Vancomycin Trough 5.10 mcg/mL     Vancomycin, Peak [[id number]]  (Abnormal) Collected: 05/12/21 2003    Specimen: Blood Updated: 05/12/21 2030     Vancomycin Peak 15.70 mcg/mL     Sedimentation Rate [[id number]]  (Abnormal) Collected: 05/12/21 0459    Specimen: Blood Updated: 05/12/21 0944     Sed Rate 84 mm/hr     C-reactive Protein [[id number]]  (Abnormal) Collected: 05/12/21 0459    Specimen: Blood Updated: 05/12/21 0839     C-Reactive Protein 10.41 mg/dL     COVID-19 and FLU A/B PCR - Swab, Nasopharynx [[id number]]  (Normal) Collected: 05/11/21 1848    Specimen: Swab from Nasopharynx Updated: 05/11/21 1954     COVID19 Not Detected     Influenza A PCR Not Detected     Influenza B PCR Not Detected    Narrative:      Fact sheet for providers: https://www.fda.gov/media/163020/download    Fact sheet for patients: https://www.fda.gov/media/786611/download    Test performed by PCR.    Hemoglobin A1c [[id number]]  (Abnormal) Collected: 05/11/21 1529    Specimen: Blood Updated: 05/11/21 1928     Hemoglobin A1C 10.50 %     Narrative:      Hemoglobin A1C Ranges:    Increased Risk for Diabetes  5.7% to 6.4%  Diabetes                     >= 6.5%  Diabetic Goal                < 7.0%    Extra Tubes [[id number]] Collected: 05/11/21 1535    Specimen: Blood, Venous Line Updated: 05/11/21 1645    Narrative:      The following orders were created for panel order Extra Tubes.  Procedure                                Abnormality         Status                     ---------                               -----------         ------                     Light Blue Top[[id number]]                                   Final result               Gold Top - SST[[id number]]                                   Final result                 Please view results for these tests on the individual orders.    Light Blue Top [[id number]] Collected: 05/11/21 1535    Specimen: Blood Updated: 05/11/21 1645     Extra Tube hold for add-on     Comment: Auto resulted       Gold Top - SST [[id number]] Collected: 05/11/21 1535    Specimen: Blood Updated: 05/11/21 1645     Extra Tube Hold for add-ons.     Comment: Auto resulted.       CK [[id number]]  (Normal) Collected: 05/11/21 1529    Specimen: Blood Updated: 05/11/21 1559     Creatine Kinase 64 U/L     Lactic Acid, Plasma [[id number]]  (Normal) Collected: 05/11/21 1529    Specimen: Blood Updated: 05/11/21 1554     Lactate 1.4 mmol/L     CBC & Differential [[id number]]  (Abnormal) Collected: 05/11/21 1529    Specimen: Blood Updated: 05/11/21 1538    Narrative:      The following orders were created for panel order CBC & Differential.  Procedure                               Abnormality         Status                     ---------                               -----------         ------                     CBC Auto Differential[[id number]]        Abnormal            Final result                 Please view results for these tests on the individual orders.        Imaging Results (Most Recent)     Procedure Component Value Units Date/Time    XR Foot 3+ View Right [[id number]] Collected: 05/11/21 1531     Updated: 05/11/21 1557    Narrative:      EXAM: XR FOOT 3 OR MORE VIEWS    INDICATION: injury to toes, pedal edema/cellulitis    COMPARISON: None    FINDINGS: Acute, mildly impacted fractures involving the right  second and third proximal phalanges with extension into the  proximal interphalangeal joints. Additional  "acute fracture  involving the base of the right third proximal phalanx with  extension into the metatarsophalangeal joint. Very mild  hypertrophic degenerative change in the midfoot. Small plantar  calcaneal spur.    Vascular calcifications.          Impression:      Acute fractures of the right second and third  proximal phalanges, as above.      Electronically signed by:  Devyn Gusman MD  5/11/2021 3:56 PM CDT  Workstation: [id number]          Chief Complaint on Day of Discharge: No complaints    Hospital Course:  The patient is a 66 y.o. male with history of diabetes mellitus who presented to [facility] with right foot and toe pain dropping a piece of furniture on his foot a few days prior to admission.  X-ray showed fracture of the right second toe.  Patient was admitted and started on antibiotics for underlying cellulitis.  Patient was seen by podiatry.  Patient underwent amputation of his second toe and cultures were obtained at that time.  Cultures from surgery grew MSSA.  Patient's antibiotics were revised.  Patient did well postoperatively and his pain was controlled.  He was started on Metformin to help with his glycemic control as well.  As patient was otherwise noted to be doing well he will be discharged home and follow-up with PCP and podiatry as an outpatient.  He will continue with antibiotic therapy with p.o. Augmentin.  Patient voiced understanding agreement to this plan..      Condition on Discharge: Stable    Physical Exam on Discharge:  /57 (BP Location: Left arm, Patient Position: Lying)   Pulse 76   Temp 99.2 °F (37.3 °C) (Temporal)   Resp 18   Ht 170.2 cm (67\")   Wt 57 kg (125 lb 9.6 oz)   SpO2 96%   BMI 19.67 kg/m²   Physical Exam  Constitutional:       General: He is not in acute distress.     Appearance: He is not toxic-appearing.   HENT:      Head: Normocephalic and atraumatic.      Right Ear: External ear normal.      Left Ear: External ear normal.      " Nose: Nose normal.      Mouth/Throat:      Mouth: Mucous membranes are moist.      Pharynx: Oropharynx is clear.   Eyes:      Conjunctiva/sclera: Conjunctivae normal.   Cardiovascular:      Rate and Rhythm: Normal rate and regular rhythm.      Pulses: Normal pulses.      Heart sounds: Normal heart sounds.   Pulmonary:      Effort: Pulmonary effort is normal. No respiratory distress.      Breath sounds: Normal breath sounds.   Abdominal:      General: Bowel sounds are normal.      Palpations: Abdomen is soft.      Tenderness: There is no abdominal tenderness.   Musculoskeletal:      Cervical back: Neck supple.      Comments: Surgical boot in place on right foot.   Skin:     General: Skin is warm and dry.      Capillary Refill: Capillary refill takes less than 2 seconds.   Neurological:      General: No focal deficit present.      Mental Status: He is alert and oriented to person, place, and time. Mental status is at baseline.      Coordination: Coordination normal.   Psychiatric:         Mood and Affect: Mood normal.         Behavior: Behavior normal.         Discharge Disposition:  Home or Self Care    Discharge Medications:     Discharge Medications      New Medications      Instructions Start Date   amoxicillin-clavulanate 875-125 MG per tablet  Commonly known as: Augmentin   1 tablet, Oral, 2 Times Daily      HYDROcodone-acetaminophen 7.5-325 MG per tablet  Commonly known as: NORCO   1 tablet, Oral, Every 6 Hours PRN      metFORMIN  MG 24 hr tablet  Commonly known as: GLUCOPHAGE-XR   1,000 mg, Oral, Daily With Breakfast             Discharge Diet:   Diet Instructions     Diet: Consistent Carbohydrate      Discharge Diet: Consistent Carbohydrate          Activity at Discharge:   Activity Instructions     Activity as Tolerated      While wearing surgical boot.          Discharge Care Plan/Instructions: Take medications as prescribed, follow-up with PCP, follow-up with podiatry, return for worsening  symptoms.    Follow-up Appointments:   No future appointments.    Test Results Pending at Discharge:   Pending Labs     Order Current Status    Tissue Pathology Exam In process          Tao Gil MD    Time: 32 minutes

## 2021-05-17 NOTE — OUTREACH NOTE
Prep Survey      Responses   [facility] [place] patient discharged from?  [place]   Is LACE score < 7 ?  No   Emergency Room discharge w/ pulse ox?  No   Eligibility  [facility]   Date of Admission  05/11/21   Date of Discharge  05/17/21   Discharge Disposition  Home or Self Care   Discharge diagnosis  Cellulitis of right foot   Does the patient have one of the following disease processes/diagnoses(primary or secondary)?  Other   Does the patient have Home health ordered?  No   Is there a DME ordered?  No   Prep survey completed?  Yes          Alisia Horan RN

## 2021-05-17 NOTE — THERAPY TREATMENT NOTE
Acute Care - Physical Therapy Treatment Note  [facility]     Patient Name: Jeffry Blanton  : 1955  MRN: [id number]  Today's Date: 2021           PT Assessment (last 12 hours)      PT Evaluation and Treatment     Row Name 21 1235          Physical Therapy Time and Intention    Subjective Information  no complaints  -RW     Document Type  therapy note (daily note)  -RW     Mode of Treatment  physical therapy  -RW     Patient Effort  good  -RW     Comment  nurse in room, preparing for dc  -RW     Row Name 21 1235          General Information    Patient Profile Reviewed  yes  -RW     Existing Precautions/Restrictions  fall  -RW     Row Name 21 1235          Cognition    Orientation Status (Cognition)  oriented to;person;situation;place  -RW     Row Name 21 1235          Bed Mobility    Supine-Sit [place] (Bed Mobility)  independent  -RW     Sit-Supine [place] (Bed Mobility)  independent  -RW     Assistive Device (Bed Mobility)  bed rails;head of bed elevated  -RW     Row Name 21 1235          Transfers    Sit-Stand [place] (Transfers)  independent  -RW     Row Name 21 1235          Gait/Stairs (Locomotion)    [place] Level (Gait)  independent  -RW     Distance in Feet (Gait)  640  -RW     Pattern (Gait)  step-through  -RW     Right Sided Gait Deviations  -- wearing boot on right  -RW     Row Name             Wound 21 Right anterior second toe    Wound - Properties Group Placement Date: 21  -BG Placement Time: 2009  -BG Side: Right  -BG Orientation: anterior  -BG Location: second toe  -BG Stage, Pressure Injury : unstageable  -BG    Retired Wound - Properties Group Date first assessed: 21  -BG Time first assessed:   -BG Side: Right  -BG Location: second toe  -BG    Row Name             Wound 21 0839 Right second toe Amputation stump    Wound - Properties Group Placement Date: 21  -MS Placement Time:  0839  -MS Present on Hospital Admission: N  -MS Side: Right  -MS Location: second toe  -MS Primary Wound Type: Amputation s  -MS    Retired Wound - Properties Group Date first assessed: 05/14/21  -MS Time first assessed: 0839  -MS Present on Hospital Admission: N  -MS Side: Right  -MS Location: second toe  -MS Primary Wound Type: Amputation s  -MS    Row Name 05/17/21 1235          Plan of Care Review    Outcome Summary  pt ind with transfers and gt.pt understands wearing boot for amb as verified by translater. ready for dc home  -RW     Row Name 05/17/21 1235          Transfer Goal 1 (PT)    Activity/Assistive Device (Transfer Goal 1, PT)  sit-to-stand/stand-to-sit;bed-to-chair/chair-to-bed  -RW     [place] Level/Cues Needed (Transfer Goal 1, PT)  independent  -RW     Time Frame (Transfer Goal 1, PT)  2 days  -RW     Progress/Outcome (Transfer Goal 1, PT)  (S) goal met  -RW     Row Name 05/17/21 1235          Gait Training Goal 1 (PT)    Activity/Assistive Device (Gait Training Goal 1, PT)  gait (walking locomotion);decrease fall risk  -RW     [place] Level (Gait Training Goal 1, PT)  independent  -RW     Distance (Gait Training Goal 1, PT)  500ft or more  -RW     Time Frame (Gait Training Goal 1, PT)  3 days  -RW     Progress/Outcome (Gait Training Goal 1, PT)  (S) goal met  -RW     Row Name 05/17/21 1235          Stairs Goal 1 (PT)    Activity/Assistive Device (Stairs Goal 1, PT)  ascending stairs;descending stairs;using handrail, left;using handrail, right  -RW     [place] Level/Cues Needed (Stairs Goal 1, PT)  supervision required  -RW     Number of Stairs (Stairs Goal 1, PT)  2 or more  -RW     Time Frame (Stairs Goal 1, PT)  by discharge  -RW     Progress/Outcome (Stairs Goal 1, PT)  (S) goal met  -RW       User Key  (r) = Recorded By, (t) = Taken By, (c) = Cosigned By    Initials Name Provider Type    Hector Gilmore, RN Registered Nurse    Nirmal Giles, PTA Physical Therapy Assistant     Maggie Ho, RN Registered Nurse        Physical Therapy Education                 Title: PT OT SLP Therapies (In Progress)     Topic: Physical Therapy (In Progress)     Point: Mobility training (Done)     Learning Progress Summary           Patient Acceptance, E, VU by  at 5/14/2021 1235    Comment: Role of PT, POC, use of gait belt, WBAT                   Point: Home exercise program (Not Started)     Learner Progress:  Not documented in this visit.          Point: Body mechanics (Not Started)     Learner Progress:  Not documented in this visit.          Point: Precautions (Done)     Learning Progress Summary           Patient Acceptance, E, VU by  at 5/14/2021 1235    Comment: Role of PT, POC, use of gait belt, WBAT                               User Key     Initials Effective Dates Name Provider Type Discipline     08/09/20 -  Angeline Dale, FANNY Physical Therapist PT              PT Recommendation and Plan  Anticipated Discharge Disposition (PT): home with assist  Plan of Care Reviewed With: patient  Progress: improving  Outcome Summary: pt ind with transfers and gt.pt understands wearing boot for amb as verified by translater. ready for dc home       Time Calculation:   PT Charges     Row Name 05/17/21 1353             Time Calculation    Start Time  1235  -RW      Stop Time  1253  -RW      Time Calculation (min)  18 min  -RW         Time Calculation- PT    Total Timed Code Minutes- PT  18 minute(s)  -RW        User Key  (r) = Recorded By, (t) = Taken By, (c) = Cosigned By    Initials Name Provider Type     Nirmal Bright PTA Physical Therapy Assistant        Therapy Charges for Today     Code Description Service Date Service Provider Modifiers Qty    25253114135 HC GAIT TRAINING EA 15 MIN 5/16/2021 Nirmal Bright PTA GP 1    [id number] HC GAIT TRAINING EA 15 MIN 5/17/2021 Nirmal Bright PTA GP 1          PT G-Codes  Outcome Measure Options: AM-PAC 6 Clicks Basic Mobility (PT)  AM-PAC 6  Clicks Score (PT): 20  AM-PAC 6 Clicks Score (OT): 24    Nirmal Bright, PTA  5/17/2021

## 2021-05-18 ENCOUNTER — TRANSITIONAL CARE MANAGEMENT TELEPHONE ENCOUNTER (OUTPATIENT)
Dept: CALL CENTER | Facility: HOSPITAL | Age: 66
End: 2021-05-18

## 2021-05-18 NOTE — OUTREACH NOTE
Call Center TCM Note      Responses   [facility] patient discharged from?  [place]   Does the patient have one of the following disease processes/diagnoses(primary or secondary)?  Other   TCM attempt successful?  No   Unsuccessful attempts  Attempt 2          Richard Sainz RN    5/18/2021, 15:35 EDT

## 2021-05-18 NOTE — OUTREACH NOTE
Call Center TCM Note      Responses   [facility] patient discharged from?  [place]   Does the patient have one of the following disease processes/diagnoses(primary or secondary)?  Other   TCM attempt successful?  No   Unsuccessful attempts  Attempt 1   Call Status  -- [no voicemail]          Richard Sainz RN    5/18/2021, 14:50 EDT

## 2021-05-18 NOTE — PAYOR COMM NOTE
"Samantha Costa  [facility]  P: [phone]  F: [phone]    [facility]#[id number]    Lachelle Ortega (66 y.o. Male)     Date of Birth Social Security Number Address Home Phone MRN    1955  [address] [place], [place] [address] [phone] [id number]    Gnosticism Marital Status          None        Admission Date Admission Type Admitting Provider Attending Provider Department, Room/Bed    5/11/21 Emergency Tao Gil MD  [facility] 3 [facility], 374/1    Discharge Date Discharge Disposition Discharge Destination        5/17/2021 Home or Self Care              Attending Provider: (none)   Allergies: No Known Allergies    Isolation: None   Infection: None   Code Status: Prior    Ht: 170.2 cm (67\")   Wt: 57 kg (125 lb 9.6 oz)    Admission Cmt: None   Principal Problem: None                Active Insurance as of 5/11/2021     Primary Coverage     Payor Plan Insurance Group Employer/Plan Group    [facility] [id number]     Payor Plan Address Payor Plan Phone Number Payor Plan Fax Number Effective Dates    [address] [address] [phone]  1/1/2020 - None Entered    [facility] [id number]       Subscriber Name Subscriber Birth Date Member ID       LACHELLE ORTEGA [id number]                 Emergency Contacts      (Rel.) Home Phone Work Phone Mobile Phone    Katrina Govea (Spouse) [phone] -- [phone]               Discharge Summary      Tao Gil MD at 05/17/21 0949              [facility] Medicine Services  DISCHARGE SUMMARY       Date of Admission: 5/11/2021  Date of Discharge:  5/17/2021  Primary Care Physician: Provider, No Known    Presenting Problem/History of Present Illness:  Hyperglycemia [R73.9]  Cellulitis of right foot [L03.115]  Cellulitis of left lower extremity [L03.116]  Open nondisplaced fracture of phalanx of toe of left foot, unspecified toe, initial encounter " [S92.911E]     Final Discharge Diagnoses:  Active Hospital Problems    Diagnosis    • Severe malnutrition (CMS/HCC)    • Cellulitis of left lower extremity    • Cellulitis of right foot    • Type 2 diabetes mellitus (CMS/HCC)    • Hyponatremia        Consults:   Consults     Date and Time Order Name Status Description    5/11/2021  7:10 PM Inpatient Podiatry Consult      5/11/2021  5:53 PM Foot / Ankle (on-call MD unless specified) Completed     5/11/2021  5:52 PM Hospitalist (on-call MD unless specified)            Procedures Performed: Procedure(s):  AMPUTATION SECOND DIGIT RIGHT                Pertinent Test Results:   Lab Results (most recent)     Procedure Component Value Units Date/Time    POC Glucose Once [[id number]]  (Abnormal) Collected: 05/17/21 0638    Specimen: Blood Updated: 05/17/21 0708     Glucose 210 mg/dL      Comment: RN NotifiedOperator: [id number] OMAR ELRITAMeter ID: [id number]       Comprehensive Metabolic Panel [[id number]]  (Abnormal) Collected: 05/17/21 0526    Specimen: Blood Updated: 05/17/21 0614     Glucose 247 mg/dL      BUN 9 mg/dL      Creatinine 0.56 mg/dL      Sodium 135 mmol/L      Potassium 3.9 mmol/L      Chloride 99 mmol/L      CO2 28.0 mmol/L      Calcium 8.8 mg/dL      Total Protein 6.9 g/dL      Albumin 3.10 g/dL      ALT (SGPT) 12 U/L      AST (SGOT) 11 U/L      Alkaline Phosphatase 109 U/L      Total Bilirubin 0.4 mg/dL      eGFR Non African Amer 147 mL/min/1.73      eGFR  African Amer >150 mL/min/1.73      Globulin 3.8 gm/dL      A/G Ratio 0.8 g/dL      BUN/Creatinine Ratio 16.1     Anion Gap 8.0 mmol/L     Narrative:      GFR Normal >60  Chronic Kidney Disease <60  Kidney Failure <15      CBC Auto Differential [[id number]]  (Abnormal) Collected: 05/17/21 0526    Specimen: Blood Updated: 05/17/21 0541     WBC 8.35 10*3/mm3      RBC 4.08 10*6/mm3      Hemoglobin 11.7 g/dL      Hematocrit 35.5 %      MCV 87.0 fL      MCH 28.7 pg      MCHC 33.0 g/dL      RDW 14.4 %       RDW-SD 45.5 fl      MPV 8.6 fL      Platelets 443 10*3/mm3      Neutrophil % 59.0 %      Lymphocyte % 26.6 %      Monocyte % 11.7 %      Eosinophil % 1.6 %      Basophil % 0.5 %      Immature Grans % 0.6 %      Neutrophils, Absolute 4.93 10*3/mm3      Lymphocytes, Absolute 2.22 10*3/mm3      Monocytes, Absolute 0.98 10*3/mm3      Eosinophils, Absolute 0.13 10*3/mm3      Basophils, Absolute 0.04 10*3/mm3      Immature Grans, Absolute 0.05 10*3/mm3      nRBC 0.0 /100 WBC     POC Glucose Once [[id number]]  (Abnormal) Collected: 05/16/21 2027    Specimen: Blood Updated: 05/16/21 2123     Glucose 206 mg/dL      Comment: RN NotifiedOperator: [id number] OMAR ELRITAMeter ID: [id number]       Blood Culture - Blood, Hand, Right [[id number]] Collected: 05/11/21 2105    Specimen: Blood from Hand, Right Updated: 05/16/21 2115     Blood Culture No growth at 5 days    Blood Culture - Blood, Arm, Left [[id number]] Collected: 05/11/21 1529    Specimen: Blood from Arm, Left Updated: 05/16/21 1545     Blood Culture No growth at 5 days    Wound Culture - Wound, Toe, Right [[id number]]  (Abnormal)  (Susceptibility) Collected: 05/14/21 0815    Specimen: Wound from Toe, Right Updated: 05/16/21 0932     Wound Culture Light growth (2+) Staphylococcus aureus     Gram Stain Rare (1+) WBCs seen      Moderate (3+) Gram positive cocci in pairs    Susceptibility      Staphylococcus aureus      FELICITAS      Clindamycin Susceptible      Erythromycin Susceptible      Inducible Clindamycin Resistance Negative      Oxacillin Susceptible      Penicillin G Resistant      Rifampin Susceptible      Tetracycline Susceptible      Trimethoprim + Sulfamethoxazole Susceptible      Vancomycin Susceptible               Linear View               Susceptibility Comments     Staphylococcus aureus    This isolate does not demonstrate inducible clindamycin resistance in vitro.               Comprehensive Metabolic Panel [[id number]]  (Abnormal) Collected: 05/16/21 6529     Specimen: Blood Updated: 05/16/21 0659     Glucose 177 mg/dL      BUN 8 mg/dL      Creatinine 0.51 mg/dL      Sodium 136 mmol/L      Potassium 3.6 mmol/L      Chloride 99 mmol/L      CO2 28.0 mmol/L      Calcium 8.8 mg/dL      Total Protein 6.9 g/dL      Albumin 3.40 g/dL      ALT (SGPT) 9 U/L      AST (SGOT) 10 U/L      Alkaline Phosphatase 95 U/L      Total Bilirubin 0.6 mg/dL      eGFR Non African Amer >150 mL/min/1.73      eGFR  African Amer >150 mL/min/1.73      Globulin 3.5 gm/dL      A/G Ratio 1.0 g/dL      BUN/Creatinine Ratio 15.7     Anion Gap 9.0 mmol/L     Narrative:      GFR Normal >60  Chronic Kidney Disease <60  Kidney Failure <15      CBC Auto Differential [[id number]]  (Abnormal) Collected: 05/16/21 0544    Specimen: Blood Updated: 05/16/21 0620     WBC 9.13 10*3/mm3      RBC 4.16 10*6/mm3      Hemoglobin 12.6 g/dL      Hematocrit 37.0 %      MCV 88.9 fL      MCH 30.3 pg      MCHC 34.1 g/dL      RDW 15.0 %      RDW-SD 47.8 fl      MPV 8.6 fL      Platelets 411 10*3/mm3      Neutrophil % 67.6 %      Lymphocyte % 18.5 %      Monocyte % 11.6 %      Eosinophil % 1.5 %      Basophil % 0.3 %      Immature Grans % 0.5 %      Neutrophils, Absolute 6.16 10*3/mm3      Lymphocytes, Absolute 1.69 10*3/mm3      Monocytes, Absolute 1.06 10*3/mm3      Eosinophils, Absolute 0.14 10*3/mm3      Basophils, Absolute 0.03 10*3/mm3      Immature Grans, Absolute 0.05 10*3/mm3      nRBC 0.0 /100 WBC     Vancomycin, Trough 30 minutes before vancomycin infusion please [[id number]]  (Normal) Collected: 05/14/21 1400    Specimen: Blood Updated: 05/14/21 1547     Vancomycin Trough 13.50 mcg/mL     Vancomycin, Peak 1 hour after vancomycin infusion please [[id number]]  (Abnormal) Collected: 05/14/21 1007    Specimen: Blood Updated: 05/14/21 1044     Vancomycin Peak 19.00 mcg/mL     Tissue Pathology Exam [[id number]] Collected: 05/14/21 0825    Specimen: Bone from Toe, Right Updated: 05/14/21 0855    Vancomycin, Trough [[id number]]   (Normal) Collected: 05/13/21 0555    Specimen: Blood Updated: 05/13/21 0626     Vancomycin Trough 5.10 mcg/mL     Vancomycin, Peak [[id number]]  (Abnormal) Collected: 05/12/21 2003    Specimen: Blood Updated: 05/12/21 2030     Vancomycin Peak 15.70 mcg/mL     Sedimentation Rate [[id number]]  (Abnormal) Collected: 05/12/21 0459    Specimen: Blood Updated: 05/12/21 0944     Sed Rate 84 mm/hr     C-reactive Protein [[id number]]  (Abnormal) Collected: 05/12/21 0459    Specimen: Blood Updated: 05/12/21 0839     C-Reactive Protein 10.41 mg/dL     COVID-19 and FLU A/B PCR - Swab, Nasopharynx [[id number]]  (Normal) Collected: 05/11/21 1848    Specimen: Swab from Nasopharynx Updated: 05/11/21 1954     COVID19 Not Detected     Influenza A PCR Not Detected     Influenza B PCR Not Detected    Narrative:      Fact sheet for providers: https://www.fda.gov/media/080683/download    Fact sheet for patients: https://www.fda.gov/media/740190/download    Test performed by PCR.    Hemoglobin A1c [[id number]]  (Abnormal) Collected: 05/11/21 1529    Specimen: Blood Updated: 05/11/21 1928     Hemoglobin A1C 10.50 %     Narrative:      Hemoglobin A1C Ranges:    Increased Risk for Diabetes  5.7% to 6.4%  Diabetes                     >= 6.5%  Diabetic Goal                < 7.0%    Extra Tubes [[id number]] Collected: 05/11/21 1535    Specimen: Blood, Venous Line Updated: 05/11/21 1645    Narrative:      The following orders were created for panel order Extra Tubes.  Procedure                               Abnormality         Status                     ---------                               -----------         ------                     Light Blue Top[[id number]]                                   Final result               Gold Top - SST[[id number]]                                   Final result                 Please view results for these tests on the individual orders.    Light Blue Top [[id number]] Collected: 05/11/21 1535    Specimen: Blood  Updated: 05/11/21 1645     Extra Tube hold for add-on     Comment: Auto resulted       Gold Top - SST [[id number]] Collected: 05/11/21 1535    Specimen: Blood Updated: 05/11/21 1645     Extra Tube Hold for add-ons.     Comment: Auto resulted.       CK [[id number]]  (Normal) Collected: 05/11/21 1529    Specimen: Blood Updated: 05/11/21 1559     Creatine Kinase 64 U/L     Lactic Acid, Plasma [[id number]]  (Normal) Collected: 05/11/21 1529    Specimen: Blood Updated: 05/11/21 1554     Lactate 1.4 mmol/L     CBC & Differential [[id number]]  (Abnormal) Collected: 05/11/21 1529    Specimen: Blood Updated: 05/11/21 1538    Narrative:      The following orders were created for panel order CBC & Differential.  Procedure                               Abnormality         Status                     ---------                               -----------         ------                     CBC Auto Differential[[id number]]        Abnormal            Final result                 Please view results for these tests on the individual orders.        Imaging Results (Most Recent)     Procedure Component Value Units Date/Time    XR Foot 3+ View Right [[id number]] Collected: 05/11/21 1531     Updated: 05/11/21 1557    Narrative:      EXAM: XR FOOT 3 OR MORE VIEWS    INDICATION: injury to toes, pedal edema/cellulitis    COMPARISON: None    FINDINGS: Acute, mildly impacted fractures involving the right  second and third proximal phalanges with extension into the  proximal interphalangeal joints. Additional acute fracture  involving the base of the right third proximal phalanx with  extension into the metatarsophalangeal joint. Very mild  hypertrophic degenerative change in the midfoot. Small plantar  calcaneal spur.    Vascular calcifications.          Impression:      Acute fractures of the right second and third  proximal phalanges, as above.      Electronically signed by:  Devyn Gusman MD  5/11/2021 3:56 PM CDT  Workstation: [id number]     "      Chief Complaint on Day of Discharge: No complaints    Hospital Course:  The patient is a 66 y.o. male with history of diabetes mellitus who presented to [facility] with right foot and toe pain dropping a piece of furniture on his foot a few days prior to admission.  X-ray showed fracture of the right second toe.  Patient was admitted and started on antibiotics for underlying cellulitis.  Patient was seen by podiatry.  Patient underwent amputation of his second toe and cultures were obtained at that time.  Cultures from surgery grew MSSA.  Patient's antibiotics were revised.  Patient did well postoperatively and his pain was controlled.  He was started on Metformin to help with his glycemic control as well.  As patient was otherwise noted to be doing well he will be discharged home and follow-up with PCP and podiatry as an outpatient.  He will continue with antibiotic therapy with p.o. Augmentin.  Patient voiced understanding agreement to this plan..      Condition on Discharge: Stable    Physical Exam on Discharge:  /57 (BP Location: Left arm, Patient Position: Lying)   Pulse 76   Temp 99.2 °F (37.3 °C) (Temporal)   Resp 18   Ht 170.2 cm (67\")   Wt 57 kg (125 lb 9.6 oz)   SpO2 96%   BMI 19.67 kg/m²   Physical Exam  Constitutional:       General: He is not in acute distress.     Appearance: He is not toxic-appearing.   HENT:      Head: Normocephalic and atraumatic.      Right Ear: External ear normal.      Left Ear: External ear normal.      Nose: Nose normal.      Mouth/Throat:      Mouth: Mucous membranes are moist.      Pharynx: Oropharynx is clear.   Eyes:      Conjunctiva/sclera: Conjunctivae normal.   Cardiovascular:      Rate and Rhythm: Normal rate and regular rhythm.      Pulses: Normal pulses.      Heart sounds: Normal heart sounds.   Pulmonary:      Effort: Pulmonary effort is normal. No respiratory distress.      Breath sounds: Normal breath sounds.   Abdominal:      " General: Bowel sounds are normal.      Palpations: Abdomen is soft.      Tenderness: There is no abdominal tenderness.   Musculoskeletal:      Cervical back: Neck supple.      Comments: Surgical boot in place on right foot.   Skin:     General: Skin is warm and dry.      Capillary Refill: Capillary refill takes less than 2 seconds.   Neurological:      General: No focal deficit present.      Mental Status: He is alert and oriented to person, place, and time. Mental status is at baseline.      Coordination: Coordination normal.   Psychiatric:         Mood and Affect: Mood normal.         Behavior: Behavior normal.         Discharge Disposition:  Home or Self Care    Discharge Medications:     Discharge Medications      New Medications      Instructions Start Date   amoxicillin-clavulanate 875-125 MG per tablet  Commonly known as: Augmentin   1 tablet, Oral, 2 Times Daily      HYDROcodone-acetaminophen 7.5-325 MG per tablet  Commonly known as: NORCO   1 tablet, Oral, Every 6 Hours PRN      metFORMIN  MG 24 hr tablet  Commonly known as: GLUCOPHAGE-XR   1,000 mg, Oral, Daily With Breakfast             Discharge Diet:   Diet Instructions     Diet: Consistent Carbohydrate      Discharge Diet: Consistent Carbohydrate          Activity at Discharge:   Activity Instructions     Activity as Tolerated      While wearing surgical boot.          Discharge Care Plan/Instructions: Take medications as prescribed, follow-up with PCP, follow-up with podiatry, return for worsening symptoms.    Follow-up Appointments:   No future appointments.    Test Results Pending at Discharge:   Pending Labs     Order Current Status    Tissue Pathology Exam In process          Tao Gil MD    Time: 32 minutes      Electronically signed by Tao Gil MD at 05/17/21 1400       Discharge Order (From admission, onward)     Start     Ordered    05/17/21 0948  Discharge patient  Once     Expected Discharge Date: 05/17/21    Discharge  Disposition: Home or Self Care    Physician of Record for Attribution - Please select from Treatment Team: CHAS LAMB [[id number]]    Review needed by CMO to determine Physician of Record: No       Question Answer Comment   Physician of Record for Attribution - Please select from Treatment Team CHAS LAMB    Review needed by CMO to determine Physician of Record No        05/17/21 0949

## 2021-05-19 ENCOUNTER — TRANSITIONAL CARE MANAGEMENT TELEPHONE ENCOUNTER (OUTPATIENT)
Dept: CALL CENTER | Facility: HOSPITAL | Age: 66
End: 2021-05-19

## 2021-05-19 LAB
LAB AP CASE REPORT: NORMAL
PATH REPORT.FINAL DX SPEC: NORMAL

## 2021-05-19 NOTE — OUTREACH NOTE
Call Center TCM Note      Responses   [facility] patient discharged from?  [place]   Does the patient have one of the following disease processes/diagnoses(primary or secondary)?  Other   TCM attempt successful?  Yes   Call start time  0948   Call end time  1005   Discharge diagnosis  Cellulitis of right foot   If primary language is not English what is the name and relationship or agency of  used?  [employer]: #[id number]   Does the patient have all medications ordered at discharge?  Yes   Is the patient taking all medications as directed (includes completed medication regime)?  Yes   Does the patient have a primary care provider?   Yes   Does the patient have an appointment with their PCP within 7 days of discharge?  Yes   Comments regarding PCP  new patient appt on 5/24 with Dr. Dyson at 2:45 pm   Has the patient kept scheduled appointments due by today?  N/A   Did the patient receive a copy of their discharge instructions?  Yes   Nursing interventions  Reviewed instructions with patient   What is the patient's perception of their health status since discharge?  Improving   Is the patient/caregiver able to teach back the hierarchy of who to call/visit for symptoms/problems? PCP, Specialist, Home health nurse, Urgent Care, ED, 911  Yes   TCM call completed?  Yes   Wrap up additional comments  Via  patient states he is doing fine and following his discharge instructions, reviewed new patient appt with Dr. Dyson for 4/24, patient verbalized understanding, let patient know all appt information was listed in his discharge paperwork.          Shruti Dyson RN    5/19/2021, 10:05 EDT

## 2021-05-24 ENCOUNTER — OFFICE VISIT (OUTPATIENT)
Dept: FAMILY MEDICINE CLINIC | Facility: CLINIC | Age: 66
End: 2021-05-24

## 2021-05-24 VITALS
DIASTOLIC BLOOD PRESSURE: 80 MMHG | HEIGHT: 67 IN | WEIGHT: 118 LBS | BODY MASS INDEX: 18.52 KG/M2 | SYSTOLIC BLOOD PRESSURE: 138 MMHG

## 2021-05-24 DIAGNOSIS — L03.116 CELLULITIS OF LEFT LOWER EXTREMITY: Primary | ICD-10-CM

## 2021-05-24 DIAGNOSIS — Z12.5 SCREENING PSA (PROSTATE SPECIFIC ANTIGEN): ICD-10-CM

## 2021-05-24 DIAGNOSIS — Z23 NEED FOR VACCINATION: ICD-10-CM

## 2021-05-24 DIAGNOSIS — L03.115 CELLULITIS OF RIGHT FOOT: ICD-10-CM

## 2021-05-24 DIAGNOSIS — E11.51 TYPE 2 DIABETES MELLITUS WITH DIABETIC PERIPHERAL ANGIOPATHY WITHOUT GANGRENE, WITHOUT LONG-TERM CURRENT USE OF INSULIN (HCC): Chronic | ICD-10-CM

## 2021-05-24 PROBLEM — E11.9 TYPE 2 DIABETES MELLITUS (HCC): Chronic | Status: ACTIVE | Noted: 2021-05-11

## 2021-05-24 PROBLEM — E87.1 HYPONATREMIA: Status: RESOLVED | Noted: 2021-05-11 | Resolved: 2021-05-24

## 2021-05-24 PROBLEM — E43 SEVERE MALNUTRITION (HCC): Status: RESOLVED | Noted: 2021-05-14 | Resolved: 2021-05-24

## 2021-05-24 PROCEDURE — 90471 IMMUNIZATION ADMIN: CPT | Performed by: FAMILY MEDICINE

## 2021-05-24 PROCEDURE — 99213 OFFICE O/P EST LOW 20 MIN: CPT | Performed by: FAMILY MEDICINE

## 2021-05-24 PROCEDURE — 90715 TDAP VACCINE 7 YRS/> IM: CPT | Performed by: FAMILY MEDICINE

## 2021-05-24 NOTE — PROGRESS NOTES
Subjective   Jeffry Blanton is a 66 y.o. male.  Presents today for follow-up hospitalization.  Hospitalized for foot cellulitis had toe amputated.  Found to be hyperglycemic.  History obtained through  brought by patient..  Is had uncontrolled sugar for some time but was not been taking medication.  Otherwise history is very disjointed.  Apparently had abdominal surgery back in the 1990s for unknown reason patient is not sure.  History and sidebar updated as best can.  Is due to see podiatry in 2 days for foot follow-up.  Past history is otherwise unknown.    History of Present Illness   HPI    The following portions of the patient's history were reviewed and updated as appropriate: allergies, current medications, past family history, past medical history, past social history, past surgical history and problem list.    Review of Systems  Review of Systems   Constitutional: Positive for unexpected weight change (Progressive apparently). Negative for activity change, appetite change and fatigue.   HENT: Negative for trouble swallowing and voice change.    Eyes: Negative for redness and visual disturbance.   Respiratory: Negative for cough and wheezing.    Cardiovascular: Negative for chest pain and palpitations.   Gastrointestinal: Negative for abdominal pain, constipation, diarrhea, nausea and vomiting.   Genitourinary: Negative for urgency.   Musculoskeletal: Negative for joint swelling.   Skin: Positive for wound.   Neurological: Negative for syncope and headaches.   Hematological: Negative for adenopathy.   Psychiatric/Behavioral: Negative for sleep disturbance.       Objective   Physical Exam  Physical Exam  Constitutional:       Appearance: He is well-developed.   HENT:      Head: Normocephalic.   Eyes:      Pupils: Pupils are equal, round, and reactive to light.   Neck:      Thyroid: No thyromegaly.   Cardiovascular:      Rate and Rhythm: Normal rate and regular rhythm.      Heart sounds:  "Normal heart sounds. No murmur heard.   No friction rub. No gallop.    Pulmonary:      Breath sounds: Normal breath sounds.   Abdominal:      General: There is no distension.      Palpations: Abdomen is soft. There is no mass.      Tenderness: There is no abdominal tenderness.   Musculoskeletal:         General: Normal range of motion.      Cervical back: Normal range of motion.      Comments: Right foot in boot left foot shows a trace edema 1+ pulses no skin breakdown   Skin:     General: Skin is warm and dry.   Neurological:      Mental Status: He is alert and oriented to person, place, and time.      Deep Tendon Reflexes: Reflexes are normal and symmetric.   Psychiatric:         Attention and Perception: Attention normal.         Mood and Affect: Affect is blunt.         Behavior: Behavior is cooperative.           Visit Vitals  /80   Ht 170.2 cm (67\")   Wt 53.5 kg (118 lb)   BMI 18.48 kg/m²     Body mass index is 18.48 kg/m².    /80   Ht 170.2 cm (67\")   Wt 53.5 kg (118 lb)   BMI 18.48 kg/m²     Assessment/Plan   Diagnoses and all orders for this visit:    1. Cellulitis of left lower extremity (Primary)    2. Cellulitis of right foot  -     Tdap Vaccine Greater Than or Equal To 8yo IM    3. Type 2 diabetes mellitus with diabetic peripheral angiopathy without gangrene, without long-term current use of insulin (CMS/[facility])  -     Ambulatory Referral to Endocrinology    4. Need for vaccination  -     Tdap Vaccine Greater Than or Equal To 8yo IM    5. Screening PSA (prostate specific antigen)  -     PSA Screen; Future    Given history patient is probably going to best be served by endocrinology evaluation.  Suspect multiple endpoint issues as well as possible question of type I versus type 2 diabetes.  Will defer to podiatry for the foot.  Will start immunization update with tetanus.  Counseled same through .  Orders as above arrangements made we will check back again in about 8 weeks and " start general health work-up with vaccines colon cancer screening etc.

## 2021-05-25 ENCOUNTER — OFFICE VISIT (OUTPATIENT)
Dept: ENDOCRINOLOGY | Facility: CLINIC | Age: 66
End: 2021-05-25

## 2021-05-25 ENCOUNTER — LAB (OUTPATIENT)
Dept: LAB | Facility: HOSPITAL | Age: 66
End: 2021-05-25

## 2021-05-25 VITALS
HEIGHT: 67 IN | SYSTOLIC BLOOD PRESSURE: 146 MMHG | WEIGHT: 118 LBS | HEART RATE: 75 BPM | BODY MASS INDEX: 18.52 KG/M2 | DIASTOLIC BLOOD PRESSURE: 76 MMHG | OXYGEN SATURATION: 99 %

## 2021-05-25 DIAGNOSIS — Z12.5 SCREENING PSA (PROSTATE SPECIFIC ANTIGEN): ICD-10-CM

## 2021-05-25 DIAGNOSIS — E11.65 TYPE 2 DIABETES MELLITUS WITH HYPERGLYCEMIA, WITH LONG-TERM CURRENT USE OF INSULIN (HCC): Primary | ICD-10-CM

## 2021-05-25 DIAGNOSIS — Z79.4 TYPE 2 DIABETES MELLITUS WITH HYPERGLYCEMIA, WITH LONG-TERM CURRENT USE OF INSULIN (HCC): Primary | ICD-10-CM

## 2021-05-25 DIAGNOSIS — D64.9 ANEMIA, UNSPECIFIED TYPE: ICD-10-CM

## 2021-05-25 PROCEDURE — 85025 COMPLETE CBC W/AUTO DIFF WBC: CPT | Performed by: INTERNAL MEDICINE

## 2021-05-25 PROCEDURE — 83540 ASSAY OF IRON: CPT | Performed by: INTERNAL MEDICINE

## 2021-05-25 PROCEDURE — 99204 OFFICE O/P NEW MOD 45 MIN: CPT | Performed by: INTERNAL MEDICINE

## 2021-05-25 PROCEDURE — 82728 ASSAY OF FERRITIN: CPT | Performed by: INTERNAL MEDICINE

## 2021-05-25 PROCEDURE — 80053 COMPREHEN METABOLIC PANEL: CPT | Performed by: INTERNAL MEDICINE

## 2021-05-25 PROCEDURE — G0103 PSA SCREENING: HCPCS

## 2021-05-25 PROCEDURE — 86341 ISLET CELL ANTIBODY: CPT | Performed by: INTERNAL MEDICINE

## 2021-05-25 PROCEDURE — 84443 ASSAY THYROID STIM HORMONE: CPT | Performed by: INTERNAL MEDICINE

## 2021-05-25 PROCEDURE — 84466 ASSAY OF TRANSFERRIN: CPT | Performed by: INTERNAL MEDICINE

## 2021-05-25 PROCEDURE — 36415 COLL VENOUS BLD VENIPUNCTURE: CPT | Performed by: INTERNAL MEDICINE

## 2021-05-25 PROCEDURE — 84681 ASSAY OF C-PEPTIDE: CPT | Performed by: INTERNAL MEDICINE

## 2021-05-25 RX ORDER — PEN NEEDLE, DIABETIC 30 GX3/16"
1 NEEDLE, DISPOSABLE MISCELLANEOUS DAILY
Qty: 100 EACH | Refills: 11 | Status: SHIPPED | OUTPATIENT
Start: 2021-05-25 | End: 2021-06-02 | Stop reason: SDUPTHER

## 2021-05-25 RX ORDER — GLUCAGON INJECTION, SOLUTION 1 MG/.2ML
1 INJECTION, SOLUTION SUBCUTANEOUS AS NEEDED
Qty: 0.4 ML | Refills: 1 | Status: SHIPPED | OUTPATIENT
Start: 2021-05-25 | End: 2021-06-02 | Stop reason: SDUPTHER

## 2021-05-25 RX ORDER — INSULIN GLARGINE AND LIXISENATIDE 100; 33 U/ML; UG/ML
INJECTION, SOLUTION SUBCUTANEOUS
Qty: 2 PEN | Refills: 11 | Status: SHIPPED | OUTPATIENT
Start: 2021-05-25 | End: 2021-06-02 | Stop reason: SDUPTHER

## 2021-05-25 NOTE — PROGRESS NOTES
"Chief Complaint   Patient presents with   • Diabetes     type 2       History of Present Illness    66 y.o. male     Diabetes Type 2    Duration - states since age 22 years old     Complications - PAD , Diabetic ulcer , Right 2nd toe sp amputation     Present Monitoring -      Fingersticks - 4 x daily      CGM -     Present Regimen - Humulin R sliding scale 4 x daily     Carb Intake - variable     Exercise - None     ==========================================  Physical Exam  /76   Pulse 75   Ht 170.2 cm (67\")   Wt 53.5 kg (118 lb)   SpO2 99%   BMI 18.48 kg/m²   AOx3  No goiter  RRR  CTA  No edema  Thin constitution , malnourished     ==========================================    Laboratory Workup    Lab Results   Component Value Date    WBC 10.42 05/25/2021    HGB 10.5 (L) 05/25/2021    HCT 32.1 (L) 05/25/2021    MCV 87.2 05/25/2021     05/25/2021       Lab Results   Component Value Date    GLUCOSE 196 (H) 05/25/2021    BUN 15 05/25/2021    CREATININE 0.72 (L) 05/25/2021    EGFRIFNONA 110 05/25/2021    EGFRIFAFRI 133 05/25/2021    BCR 20.8 05/25/2021    K 4.6 05/25/2021    CO2 25.7 05/25/2021    CALCIUM 8.8 05/25/2021    ALBUMIN 3.60 05/25/2021    AST 9 05/25/2021    ALT 16 05/25/2021           ==========================================      ICD-10-CM ICD-9-CM   1. Type 2 diabetes mellitus with hyperglycemia, with long-term current use of insulin (CMS/[facility])  E11.65 250.00    Z79.4 790.29     V58.67   2. Anemia, unspecified type  D64.9 285.9       Diabetes Mellitus  Define if type 1 or 2  --------------------------------------------------------------------------------------------------------------------------------------------    Microvascular Complications    Retinopathy                    Nephropathy   Lab Results   Component Value Date    CREATININE 0.72 (L) 05/25/2021         Albuminuria               No results found for: MALBCRERATIO      Neuropathy             " "      --------------------------------------------------------------------------------------------------------------------------------------------    Glycemic Management   Lab Results   Component Value Date    HGBA1C 10.50 (H) 05/11/2021         On metformin alone    Start soliqua 8 units am     Continue humulin R 2 per 50 above 150 sliding scale !    Criteria for Approval of Joshua    The patient has diabetes mellitus, insulin-dependent.    Our Diabetes Department has evaluated the patient in the last six months and will continue counseling on insulin adjustment.     The patient performs blood glucose testing at least four times daily with proven glucose variability from 50 to 300 mg per dl.    The patient is administering basal insulin and prandial insulin four times per day for more than six months.    The patient uses a home blood glucose monitor to assess blood glucose at least four times daily for more than six months.    The patient requires frequent adjustment of insulin treatment regimen based on blood glucose readings.    The patient has frequent variability in blood glucose readings due to activity and variability in meal content and time.     The patient has completed a diabetes education program with us.    The patient has demonstrated the ability to self-monitor her glucose.     The patient is motivated in improving diabetes control       ==========================================================================    Lipids  No results found for: CHOL, CHLPL, TRIG, HDL, LDL, LDLDIRECT    Statin    Fibrate    Vascepa  Need to start statin next appt  ==========================================================================    Blood Pressure  /76   Pulse 75   Ht 170.2 cm (67\")   Wt 53.5 kg (118 lb)   SpO2 99%   BMI 18.48 kg/m²   Next appt     ==========================================================================    Bone Health    Vit D    No results found for: [id number]    Calcium intake "     Next appt start calcium plus D   ==========================================================================    Thyroid Assessment  Lab Results   Component Value Date    TSH 1.780 05/25/2021           ==========================================================================    Vitamin B12  No results found for: [id number]      ==========================================================================  Anemia  Workup requested             Orders Placed This Encounter   Procedures   • CBC Auto Differential     Order Specific Question:   Release to patient     Answer:   Immediate   • Comprehensive Metabolic Panel     Order Specific Question:   Release to patient     Answer:   Immediate   • C-Peptide     Order Specific Question:   Release to patient     Answer:   Immediate   • Glutamic Acid Decarboxylase     Order Specific Question:   Release to patient     Answer:   Immediate   • TSH     Order Specific Question:   Release to patient     Answer:   Immediate   • Iron Profile   • Ferritin                This document has been electronically signed by Andres Castaneda MD on May 26, 2021 13:23 CDT

## 2021-05-26 ENCOUNTER — OFFICE VISIT (OUTPATIENT)
Dept: PODIATRY | Facility: CLINIC | Age: 66
End: 2021-05-26

## 2021-05-26 ENCOUNTER — READMISSION MANAGEMENT (OUTPATIENT)
Dept: CALL CENTER | Facility: HOSPITAL | Age: 66
End: 2021-05-26

## 2021-05-26 VITALS
HEART RATE: 84 BPM | DIASTOLIC BLOOD PRESSURE: 54 MMHG | WEIGHT: 118 LBS | OXYGEN SATURATION: 99 % | HEIGHT: 67 IN | SYSTOLIC BLOOD PRESSURE: 136 MMHG | BODY MASS INDEX: 18.52 KG/M2

## 2021-05-26 DIAGNOSIS — M86.9 TOE OSTEOMYELITIS (HCC): Primary | ICD-10-CM

## 2021-05-26 LAB
ALBUMIN SERPL-MCNC: 3.6 G/DL (ref 3.5–5.2)
ALBUMIN/GLOB SERPL: 1.1 G/DL
ALP SERPL-CCNC: 100 U/L (ref 39–117)
ALT SERPL W P-5'-P-CCNC: 16 U/L (ref 1–41)
ANION GAP SERPL CALCULATED.3IONS-SCNC: 9.3 MMOL/L (ref 5–15)
AST SERPL-CCNC: 9 U/L (ref 1–40)
BASOPHILS # BLD AUTO: 0.07 10*3/MM3 (ref 0–0.2)
BASOPHILS NFR BLD AUTO: 0.7 % (ref 0–1.5)
BILIRUB SERPL-MCNC: 0.5 MG/DL (ref 0–1.2)
BUN SERPL-MCNC: 15 MG/DL (ref 8–23)
BUN/CREAT SERPL: 20.8 (ref 7–25)
CALCIUM SPEC-SCNC: 8.8 MG/DL (ref 8.6–10.5)
CHLORIDE SERPL-SCNC: 97 MMOL/L (ref 98–107)
CO2 SERPL-SCNC: 25.7 MMOL/L (ref 22–29)
CREAT SERPL-MCNC: 0.72 MG/DL (ref 0.76–1.27)
DEPRECATED RDW RBC AUTO: 47.2 FL (ref 37–54)
EOSINOPHIL # BLD AUTO: 0.14 10*3/MM3 (ref 0–0.4)
EOSINOPHIL NFR BLD AUTO: 1.3 % (ref 0.3–6.2)
ERYTHROCYTE [DISTWIDTH] IN BLOOD BY AUTOMATED COUNT: 14.6 % (ref 12.3–15.4)
FERRITIN SERPL-MCNC: 96 NG/ML (ref 30–400)
GFR SERPL CREATININE-BSD FRML MDRD: 110 ML/MIN/1.73
GFR SERPL CREATININE-BSD FRML MDRD: 133 ML/MIN/1.73
GLOBULIN UR ELPH-MCNC: 3.3 GM/DL
GLUCOSE SERPL-MCNC: 196 MG/DL (ref 65–99)
HCT VFR BLD AUTO: 32.1 % (ref 37.5–51)
HGB BLD-MCNC: 10.5 G/DL (ref 13–17.7)
IMM GRANULOCYTES # BLD AUTO: 0.05 10*3/MM3 (ref 0–0.05)
IMM GRANULOCYTES NFR BLD AUTO: 0.5 % (ref 0–0.5)
IRON 24H UR-MRATE: 47 MCG/DL (ref 59–158)
IRON SATN MFR SERPL: 13 % (ref 20–50)
LYMPHOCYTES # BLD AUTO: 1.92 10*3/MM3 (ref 0.7–3.1)
LYMPHOCYTES NFR BLD AUTO: 18.4 % (ref 19.6–45.3)
MCH RBC QN AUTO: 28.5 PG (ref 26.6–33)
MCHC RBC AUTO-ENTMCNC: 32.7 G/DL (ref 31.5–35.7)
MCV RBC AUTO: 87.2 FL (ref 79–97)
MONOCYTES # BLD AUTO: 0.63 10*3/MM3 (ref 0.1–0.9)
MONOCYTES NFR BLD AUTO: 6 % (ref 5–12)
NEUTROPHILS NFR BLD AUTO: 7.61 10*3/MM3 (ref 1.7–7)
NEUTROPHILS NFR BLD AUTO: 73.1 % (ref 42.7–76)
NRBC BLD AUTO-RTO: 0 /100 WBC (ref 0–0.2)
PLATELET # BLD AUTO: 437 10*3/MM3 (ref 140–450)
PMV BLD AUTO: 9.6 FL (ref 6–12)
POTASSIUM SERPL-SCNC: 4.6 MMOL/L (ref 3.5–5.2)
PROT SERPL-MCNC: 6.9 G/DL (ref 6–8.5)
PSA SERPL-MCNC: 0.86 NG/ML (ref 0–4)
RBC # BLD AUTO: 3.68 10*6/MM3 (ref 4.14–5.8)
SODIUM SERPL-SCNC: 132 MMOL/L (ref 136–145)
TIBC SERPL-MCNC: 368 MCG/DL (ref 298–536)
TRANSFERRIN SERPL-MCNC: 247 MG/DL (ref 200–360)
TSH SERPL DL<=0.05 MIU/L-ACNC: 1.78 UIU/ML (ref 0.27–4.2)
WBC # BLD AUTO: 10.42 10*3/MM3 (ref 3.4–10.8)

## 2021-05-26 PROCEDURE — 99024 POSTOP FOLLOW-UP VISIT: CPT | Performed by: PODIATRIST

## 2021-05-26 RX ORDER — METFORMIN HYDROCHLORIDE 500 MG/1
TABLET, EXTENDED RELEASE ORAL
Qty: 60 TABLET | Refills: 11 | Status: SHIPPED | OUTPATIENT
Start: 2021-05-26 | End: 2021-06-02 | Stop reason: SDUPTHER

## 2021-05-26 RX ORDER — SULFAMETHOXAZOLE AND TRIMETHOPRIM 800; 160 MG/1; MG/1
1 TABLET ORAL 2 TIMES DAILY
Qty: 20 TABLET | Status: CANCELLED | OUTPATIENT
Start: 2021-05-26

## 2021-05-26 RX ORDER — SULFAMETHOXAZOLE AND TRIMETHOPRIM 800; 160 MG/1; MG/1
1 TABLET ORAL 2 TIMES DAILY
Qty: 20 TABLET | Refills: 0 | Status: SHIPPED | OUTPATIENT
Start: 2021-05-26

## 2021-05-26 NOTE — OUTREACH NOTE
Medical Week 2 Survey      Responses   [facility] [place] patient discharged from?  [place]   Does the patient have one of the following disease processes/diagnoses(primary or secondary)?  Other   Week 2 attempt successful?  No   Unsuccessful attempts  Attempt 1          Mitra Espinoza LPN

## 2021-05-26 NOTE — PROGRESS NOTES
Jeffry Blanton  1955  66 y.o. male     Patient came to clinic for post op appointment of second digit amputation. Patient states his pain is 4/10.     05/26/2021    Chief Complaint   Patient presents with   • Right Foot - Post-op       History of Present Illness    Jfefry Blanton is a 66 y.o.male who presents to clinic for his first postoperative visit.   used to communicate with patient.  Dressing is clean, dry and intact.  He has moderate postoperative pain.      Past Medical History:   Diagnosis Date   • Diabetes mellitus (CMS/HCC)          Past Surgical History:   Procedure Laterality Date   • ABDOMINAL SURGERY  1990     unknown reason   • AMPUTATION DIGIT Right 5/14/2021    Procedure: AMPUTATION SECOND DIGIT RIGHT;  Surgeon: Kemar Ordaz DPM;  Location: [facility];  Service: Podiatry;  Laterality: Right;         History reviewed. No pertinent family history.    No Known Allergies    Social History     Socioeconomic History   • Marital status:      Spouse name: Not on file   • Number of children: Not on file   • Years of education: Not on file   • Highest education level: Not on file   Tobacco Use   • Smoking status: Never Smoker   • Smokeless tobacco: Never Used   Vaping Use   • Vaping Use: Never used   Substance and Sexual Activity   • Alcohol use: Not Currently   • Drug use: Not Currently   • Sexual activity: Defer         Current Outpatient Medications   Medication Sig Dispense Refill   • amoxicillin-clavulanate (Augmentin) 875-125 MG per tablet Take 1 tablet by mouth 2 (Two) Times a Day for 14 days. 28 tablet 0   • Glucagon (Gvoke HypoPen 2-Pack) 1 MG/0.2ML solution auto-injector Inject 1 mg under the skin into the appropriate area as directed As Needed (for hypoglycemia). 0.4 mL 1   • Insulin Glargine-Lixisenatide (Soliqua) 100-33 UNT-MCG/ML solution pen-injector injection Inject 15 units daily before bkfast 2 pen 11   • Insulin Pen Needle (Pen Needles) 32G X 4 MM  "misc 1 each Daily. Use 1  x daily, Dx code E11.65 100 each 11   • Continuous Blood Gluc Sensor (FreeStyle Joshua 2 Sensor) misc 1 each Every 14 (Fourteen) Days. 2 each 11   • metFORMIN ER (GLUCOPHAGE-XR) 500 MG 24 hr tablet I tablet po BID w meals 60 tablet 11   • sulfamethoxazole-trimethoprim (Bactrim DS) 800-160 MG per tablet Take 1 tablet by mouth 2 (Two) Times a Day. 20 tablet 0     No current facility-administered medications for this visit.       Review of Systems   Constitutional: Negative.    HENT: Negative.    Eyes: Negative.    Respiratory: Negative.    Cardiovascular: Negative.    Gastrointestinal: Negative.    Psychiatric/Behavioral: Negative.          OBJECTIVE    /54   Pulse 84   Ht 170.2 cm (67\")   Wt 53.5 kg (118 lb)   SpO2 99%   BMI 18.48 kg/m²     Physical Exam  Vitals reviewed.   Constitutional:       General: He is not in acute distress.     Appearance: He is well-developed.   HENT:      Head: Normocephalic and atraumatic.      Nose: Nose normal.   Pulmonary:      Effort: Pulmonary effort is normal. No respiratory distress.      Breath sounds: No wheezing.   Musculoskeletal:         General: No deformity. Normal range of motion.   Skin:     General: Skin is warm.      Capillary Refill: Capillary refill takes less than 2 seconds.   Neurological:      Mental Status: He is alert and oriented to person, place, and time.   Psychiatric:         Behavior: Behavior normal.         Thought Content: Thought content normal.          Lower Extremity Exam: Incision site well approximated to right second digit amputation.  Mild maceration.  Mild erythema.  CFT immediate to distal digit.  Sensation intact light touch.  Negative Homans.      Foot/Ankle Exam        Procedures        ASSESSMENT AND PLAN    Diagnoses and all orders for this visit:    1. Toe osteomyelitis (CMS/HCC) (Primary)    Other orders  -     sulfamethoxazole-trimethoprim (Bactrim DS) 800-160 MG per tablet; Take 1 tablet by mouth 2 " (Two) Times a Day.  Dispense: 20 tablet; Refill: 0  -     Cancel: sulfamethoxazole-trimethoprim (Bactrim DS) 800-160 MG per tablet; Take 1 tablet by mouth 2 (Two) Times a Day.  Dispense: 20 tablet        - Patient doing well postoperatively.  Dressing change performed.  Keep clean, dry and intact.  Rx for Bactrim.  Recheck 1 week          This document has been electronically signed by Kemar Ordaz DPM on May 28, 2021 10:22 CDT     5/28/2021  10:22 CDT

## 2021-05-27 LAB — C PEPTIDE SERPL-MCNC: 1.6 NG/ML (ref 1.1–4.4)

## 2021-05-28 LAB — GAD65 AB SER IA-ACNC: <5 U/ML (ref 0–5)

## 2021-06-02 ENCOUNTER — DOCUMENTATION (OUTPATIENT)
Dept: ENDOCRINOLOGY | Facility: CLINIC | Age: 66
End: 2021-06-02

## 2021-06-02 ENCOUNTER — OFFICE VISIT (OUTPATIENT)
Dept: PODIATRY | Facility: CLINIC | Age: 66
End: 2021-06-02

## 2021-06-02 ENCOUNTER — READMISSION MANAGEMENT (OUTPATIENT)
Dept: CALL CENTER | Facility: HOSPITAL | Age: 66
End: 2021-06-02

## 2021-06-02 ENCOUNTER — OFFICE VISIT (OUTPATIENT)
Dept: ENDOCRINOLOGY | Facility: CLINIC | Age: 66
End: 2021-06-02

## 2021-06-02 VITALS
DIASTOLIC BLOOD PRESSURE: 92 MMHG | SYSTOLIC BLOOD PRESSURE: 172 MMHG | WEIGHT: 118 LBS | BODY MASS INDEX: 18.52 KG/M2 | OXYGEN SATURATION: 97 % | HEIGHT: 67 IN | HEART RATE: 73 BPM

## 2021-06-02 VITALS
WEIGHT: 120.1 LBS | HEIGHT: 67 IN | OXYGEN SATURATION: 98 % | SYSTOLIC BLOOD PRESSURE: 118 MMHG | DIASTOLIC BLOOD PRESSURE: 60 MMHG | HEART RATE: 74 BPM | BODY MASS INDEX: 18.85 KG/M2

## 2021-06-02 DIAGNOSIS — Z79.4 TYPE 2 DIABETES MELLITUS WITH HYPERGLYCEMIA, WITH LONG-TERM CURRENT USE OF INSULIN (HCC): Primary | ICD-10-CM

## 2021-06-02 DIAGNOSIS — M86.9 TOE OSTEOMYELITIS (HCC): Primary | ICD-10-CM

## 2021-06-02 DIAGNOSIS — E11.65 TYPE 2 DIABETES MELLITUS WITH HYPERGLYCEMIA, WITH LONG-TERM CURRENT USE OF INSULIN (HCC): Primary | ICD-10-CM

## 2021-06-02 DIAGNOSIS — D50.9 IRON DEFICIENCY ANEMIA, UNSPECIFIED IRON DEFICIENCY ANEMIA TYPE: ICD-10-CM

## 2021-06-02 PROCEDURE — 99214 OFFICE O/P EST MOD 30 MIN: CPT | Performed by: INTERNAL MEDICINE

## 2021-06-02 PROCEDURE — 99212 OFFICE O/P EST SF 10 MIN: CPT | Performed by: PODIATRIST

## 2021-06-02 RX ORDER — ISOPROPYL ALCOHOL 0.7 ML/1
SWAB TOPICAL
Qty: 120 EACH | Refills: 11 | Status: SHIPPED | OUTPATIENT
Start: 2021-06-02

## 2021-06-02 RX ORDER — BLOOD-GLUCOSE METER
KIT MISCELLANEOUS
Qty: 120 EACH | Refills: 11 | Status: SHIPPED | OUTPATIENT
Start: 2021-06-02

## 2021-06-02 RX ORDER — GLUCOSAMINE HCL/CHONDROITIN SU 500-400 MG
CAPSULE ORAL
Qty: 50 EACH | Refills: 11 | Status: SHIPPED | OUTPATIENT
Start: 2021-06-02

## 2021-06-02 RX ORDER — METFORMIN HYDROCHLORIDE 500 MG/1
TABLET, EXTENDED RELEASE ORAL
Qty: 120 TABLET | Refills: 11 | Status: SHIPPED | OUTPATIENT
Start: 2021-06-02

## 2021-06-02 RX ORDER — GLUCAGON INJECTION, SOLUTION 1 MG/.2ML
1 INJECTION, SOLUTION SUBCUTANEOUS AS NEEDED
Qty: 0.4 ML | Refills: 1 | Status: SHIPPED | OUTPATIENT
Start: 2021-06-02

## 2021-06-02 RX ORDER — LANCING DEVICE
EACH MISCELLANEOUS
Qty: 1 EACH | Refills: 11 | Status: SHIPPED | OUTPATIENT
Start: 2021-06-02

## 2021-06-02 RX ORDER — PEN NEEDLE, DIABETIC 30 GX3/16"
1 NEEDLE, DISPOSABLE MISCELLANEOUS DAILY
Qty: 100 EACH | Refills: 11 | Status: SHIPPED | OUTPATIENT
Start: 2021-06-02

## 2021-06-02 RX ORDER — PIOGLITAZONEHYDROCHLORIDE 15 MG/1
15 TABLET ORAL DAILY
Qty: 30 TABLET | Refills: 11 | Status: SHIPPED | OUTPATIENT
Start: 2021-06-02 | End: 2022-06-02

## 2021-06-02 RX ORDER — INSULIN GLARGINE AND LIXISENATIDE 100; 33 U/ML; UG/ML
INJECTION, SOLUTION SUBCUTANEOUS
Qty: 3 PEN | Refills: 11 | Status: SHIPPED | OUTPATIENT
Start: 2021-06-02 | End: 2021-06-21

## 2021-06-02 NOTE — PROGRESS NOTES
"Chief Complaint   Patient presents with   • Diabetes     type 2       History of Present Illness    66 y.o. male     Diabetes Type 2    Duration - states since age 22 years old     Complications - PAD , Diabetic ulcer , Right 2nd toe sp amputation     Present Monitoring -      Fingersticks - 4 x daily and glucose readings 300s      CGM - sample ken given, didn't bring, glucose 300s     Present Regimen - Humulin R sliding scale 4 x daily     Carb Intake - variable     Exercise - None     ==========================================  Physical Exam  /60   Pulse 74   Ht 170.2 cm (67\")   Wt 54.5 kg (120 lb 1.6 oz)   SpO2 98%   BMI 18.81 kg/m²   AOx3  No goiter  RRR  CTA  No edema  Thin constitution , malnourished     ==========================================    Laboratory Workup    Lab Results   Component Value Date    WBC 10.42 05/25/2021    HGB 10.5 (L) 05/25/2021    HCT 32.1 (L) 05/25/2021    MCV 87.2 05/25/2021     05/25/2021       Lab Results   Component Value Date    GLUCOSE 196 (H) 05/25/2021    BUN 15 05/25/2021    CREATININE 0.72 (L) 05/25/2021    EGFRIFNONA 110 05/25/2021    EGFRIFAFRI 133 05/25/2021    BCR 20.8 05/25/2021    K 4.6 05/25/2021    CO2 25.7 05/25/2021    CALCIUM 8.8 05/25/2021    ALBUMIN 3.60 05/25/2021    AST 9 05/25/2021    ALT 16 05/25/2021           ==========================================      ICD-10-CM ICD-9-CM   1. Type 2 diabetes mellitus with hyperglycemia, with long-term current use of insulin (CMS/[facility])  E11.65 250.00    Z79.4 790.29     V58.67       Diabetes Mellitus  Type 2   --------------------------------------------------------------------------------------------------------------------------------------------    Microvascular Complications    Retinopathy                    Nephropathy   Lab Results   Component Value Date    CREATININE 0.72 (L) 05/25/2021         Albuminuria               No results found for: MALBCRERATIO      Neuropathy             " "      --------------------------------------------------------------------------------------------------------------------------------------------    Glycemic Management   Lab Results   Component Value Date    HGBA1C 10.50 (H) 05/11/2021         On metformin 1 g bid    Start soliqua 8 units am - increase to 20 , may increase up to 30    Add actos     Joshua too expensive       ==========================================================================    Lipids  No results found for: CHOL, CHLPL, TRIG, HDL, LDL, LDLDIRECT    Statin    Fibrate    Vascepa  Need to start statin next appt  ==========================================================================    Blood Pressure  /60   Pulse 74   Ht 170.2 cm (67\")   Wt 54.5 kg (120 lb 1.6 oz)   SpO2 98%   BMI 18.81 kg/m²   Next appt     ==========================================================================    Bone Health    Vit D    No results found for: [id number]    Calcium intake     Next appt start calcium plus D   ==========================================================================    Thyroid Assessment  Lab Results   Component Value Date    TSH 1.780 05/25/2021           ==========================================================================    Vitamin B12  No results found for: [id number]      ==========================================================================  Anemia  Workup requested             No orders of the defined types were placed in this encounter.               This document has been electronically signed by Andres Castaneda MD on June 2, 2021 10:08 CDT                      "

## 2021-06-02 NOTE — PROGRESS NOTES
Jeffry Blanton  1955  66 y.o. male     06/02/2021     Chief Complaint   Patient presents with   • Right Foot - post op recheck       History of Present Illness    Jeffry Blanton is a 66 y.o.male who presents to clinic for his second postoperative visit.   used to communicate with patient.         Past Medical History:   Diagnosis Date   • Diabetes mellitus (CMS/HCC)          Past Surgical History:   Procedure Laterality Date   • ABDOMINAL SURGERY  1990     unknown reason   • AMPUTATION DIGIT Right 5/14/2021    Procedure: AMPUTATION SECOND DIGIT RIGHT;  Surgeon: Kemar Ordaz DPM;  Location: [facility];  Service: Podiatry;  Laterality: Right;         History reviewed. No pertinent family history.    No Known Allergies    Social History     Socioeconomic History   • Marital status:      Spouse name: Not on file   • Number of children: Not on file   • Years of education: Not on file   • Highest education level: Not on file   Tobacco Use   • Smoking status: Never Smoker   • Smokeless tobacco: Never Used   Vaping Use   • Vaping Use: Never used   Substance and Sexual Activity   • Alcohol use: Not Currently   • Drug use: Not Currently   • Sexual activity: Defer         Current Outpatient Medications   Medication Sig Dispense Refill   • Continuous Blood Gluc Sensor (FreeStyle Joshua 2 Sensor) misc 1 each Every 14 (Fourteen) Days. 2 each 11   • Glucagon (Gvoke HypoPen 2-Pack) 1 MG/0.2ML solution auto-injector Inject 1 mg under the skin into the appropriate area as directed As Needed (for hypoglycemia). 0.4 mL 1   • Insulin Glargine-Lixisenatide (Soliqua) 100-33 UNT-MCG/ML solution pen-injector injection Inject 15 units daily before bkfast 2 pen 11   • Insulin Pen Needle (Pen Needles) 32G X 4 MM misc 1 each Daily. Use 1  x daily, Dx code E11.65 100 each 11   • metFORMIN ER (GLUCOPHAGE-XR) 500 MG 24 hr tablet I tablet po BID w meals 60 tablet 11   • sulfamethoxazole-trimethoprim  "(Bactrim DS) 800-160 MG per tablet Take 1 tablet by mouth 2 (Two) Times a Day. 20 tablet 0     No current facility-administered medications for this visit.       Review of Systems   Constitutional: Negative.    HENT: Negative.    Eyes: Negative.    Respiratory: Negative.    Cardiovascular: Negative.    Gastrointestinal: Negative.    Genitourinary: Negative.    Musculoskeletal: Negative.    Psychiatric/Behavioral: Negative.          OBJECTIVE    /92 (BP Location: Right arm, Patient Position: Sitting, Cuff Size: Adult)   Pulse 73   Ht 170.2 cm (67\")   Wt 53.5 kg (118 lb)   SpO2 97%   BMI 18.48 kg/m²     Physical Exam  Vitals reviewed.   Constitutional:       General: He is not in acute distress.     Appearance: He is well-developed.   HENT:      Head: Normocephalic and atraumatic.      Nose: Nose normal.   Pulmonary:      Effort: Pulmonary effort is normal. No respiratory distress.      Breath sounds: No wheezing.   Musculoskeletal:         General: No deformity. Normal range of motion.   Skin:     General: Skin is warm.      Capillary Refill: Capillary refill takes less than 2 seconds.   Neurological:      Mental Status: He is alert and oriented to person, place, and time.   Psychiatric:         Behavior: Behavior normal.         Thought Content: Thought content normal.          Lower Extremity Exam: Incision site with central delayed healing.  Continued maceration.  No erythema.  CFT immediate to distal digit.  Sensation intact light touch.  Negative Homans.      Foot/Ankle Exam        Procedures        ASSESSMENT AND PLAN    Diagnoses and all orders for this visit:    1. Toe osteomyelitis (CMS/HCC) (Primary)        -Delayed healing to incision site.  Dressing change performed.  Keep clean, dry and intact.  Continue antibiotics until course is complete.  Recheck 1 week, possible radiographs and repeat CRP          This document has been electronically signed by Kemar Ordaz DPM on June 2, 2021 08:53 " CDT     6/2/2021  08:53 CDT

## 2021-06-02 NOTE — OUTREACH NOTE
Medical Week 3 Survey      Responses   [facility] patient discharged from?  [place]   Does the patient have one of the following disease processes/diagnoses(primary or secondary)?  Other   Week 3 attempt successful?  Yes   Call start time  1543   Call end time  1600   Discharge diagnosis  Cellulitis of right foot   If primary language is not English what is the name and relationship or agency of  used?  [facility]    Person spoke with today (if not patient) and relationship  [employer]  used with pt   Meds reviewed with patient/caregiver?  Yes   Is the patient having any side effects they believe may be caused by any medication additions or changes?  No   Does the patient have all medications ordered at discharge?  Yes   Is the patient taking all medications as directed (includes completed medication regime)?  Yes   Does the patient have an appointment with their PCP within 7 days of discharge?  Yes   Has the patient kept scheduled appointments due by today?  Yes   Has home health visited the patient within 72 hours of discharge?  N/A   Psychosocial issues?  No   Comments  pt states foot is free of swelling, wound drying   Did the patient receive a copy of their discharge instructions?  Yes   Nursing interventions  Reviewed instructions with patient   What is the patient's perception of their health status since discharge?  Improving   Is the patient/caregiver able to teach back signs and symptoms related to disease process for when to call PCP?  Yes   Is the patient/caregiver able to teach back signs and symptoms related to disease process for when to call 911?  Yes   Is the patient/caregiver able to teach back the hierarchy of who to call/visit for symptoms/problems? PCP, Specialist, Home health nurse, Urgent Care, ED, 911  Yes   Additional teach back comments  states blood sugars sometimes high but not > 400, advised pt to discuss with MD, due to values should be <200, pt states has been  working with MD's to manage, has recently received new insulin orders, will start tomorrow with new dose, states pt is trying to reduce fat and extra calories in diet, avoids salt and sweets, states has been careful to not immerse foot in water, has been wearing boot when up   Week 3 Call Completed?  Yes          Ree Inman RN

## 2021-06-09 ENCOUNTER — OFFICE VISIT (OUTPATIENT)
Dept: PODIATRY | Facility: CLINIC | Age: 66
End: 2021-06-09

## 2021-06-09 VITALS
BODY MASS INDEX: 18.83 KG/M2 | SYSTOLIC BLOOD PRESSURE: 191 MMHG | DIASTOLIC BLOOD PRESSURE: 80 MMHG | HEART RATE: 75 BPM | WEIGHT: 120 LBS | HEIGHT: 67 IN | OXYGEN SATURATION: 96 %

## 2021-06-09 DIAGNOSIS — M86.9 TOE OSTEOMYELITIS (HCC): Primary | ICD-10-CM

## 2021-06-09 PROCEDURE — 99024 POSTOP FOLLOW-UP VISIT: CPT | Performed by: PODIATRIST

## 2021-06-09 NOTE — PROGRESS NOTES
Jeffry Blanton  1955  66 y.o. male     06/09/2021     Chief Complaint   Patient presents with   • Right Foot - Follow-up, Post-op       History of Present Illness    Jeffry Blanton is a 66 y.o.male who presents to clinic for his third postoperative visit.   used to communicate with patient.         Past Medical History:   Diagnosis Date   • Diabetes mellitus (CMS/HCC)          Past Surgical History:   Procedure Laterality Date   • ABDOMINAL SURGERY  1990     unknown reason   • AMPUTATION DIGIT Right 5/14/2021    Procedure: AMPUTATION SECOND DIGIT RIGHT;  Surgeon: Kemar Ordaz DPM;  Location: [facility];  Service: Podiatry;  Laterality: Right;         History reviewed. No pertinent family history.    No Known Allergies    Social History     Socioeconomic History   • Marital status:      Spouse name: Not on file   • Number of children: Not on file   • Years of education: Not on file   • Highest education level: Not on file   Tobacco Use   • Smoking status: Never Smoker   • Smokeless tobacco: Never Used   Vaping Use   • Vaping Use: Never used   Substance and Sexual Activity   • Alcohol use: Not Currently   • Drug use: Not Currently   • Sexual activity: Defer         Current Outpatient Medications   Medication Sig Dispense Refill   • Alcohol Swabs (Alcohol Wipes) 70 % pads Use 1 x daily 120 each 11   • B-D ULTRA-FINE 33 LANCETS misc Use  1 x daily , any brand covered by insurance 120 each 11   • Blood Glucose Monitoring Suppl w/Device kit USE AS INDICATED, ANY MONITOR , ICD10 code is E11.9 1 each 1   • Continuous Blood Gluc Sensor (FreeStyle Joshua 2 Sensor) misc 1 each Every 14 (Fourteen) Days. 2 each 11   • Glucagon (Gvoke HypoPen 2-Pack) 1 MG/0.2ML solution auto-injector Inject 1 mg under the skin into the appropriate area as directed As Needed (for hypoglycemia). 0.4 mL 1   • Glucose Blood (Blood Glucose Test) strip Use 1 x daily use any brand covered by insurance or same  "brand as before ICD10 code is E11.9 50 each 11   • Insulin Glargine-Lixisenatide (Soliqua) 100-33 UNT-MCG/ML solution pen-injector injection Up to 30 units before bkfast 3 pen 11   • Insulin Pen Needle (Pen Needles) 32G X 4 MM misc 1 each Daily. Use 1  x daily, Dx code E11.65 100 each 11   • Lancet Devices (Lancing Device) misc USE AS INDICATED TO CORRELATE WITH STRIPS AND METER 1 each 11   • metFORMIN ER (GLUCOPHAGE-XR) 500 MG 24 hr tablet 2 tablet po BID w meals 120 tablet 11   • pioglitazone (Actos) 15 MG tablet Take 1 tablet by mouth Daily. 30 tablet 11   • sulfamethoxazole-trimethoprim (Bactrim DS) 800-160 MG per tablet Take 1 tablet by mouth 2 (Two) Times a Day. 20 tablet 0     No current facility-administered medications for this visit.       Review of Systems   Constitutional: Negative.    HENT: Negative.    Eyes: Negative.    Respiratory: Negative.    Cardiovascular: Negative.    Gastrointestinal: Negative.    Genitourinary: Negative.    Musculoskeletal: Negative.    Psychiatric/Behavioral: Negative.          OBJECTIVE    BP (!) 191/80   Pulse 75   Ht 170.2 cm (67\")   Wt 54.4 kg (120 lb)   SpO2 96%   BMI 18.79 kg/m²     Physical Exam  Vitals reviewed.   Constitutional:       General: He is not in acute distress.     Appearance: He is well-developed.   HENT:      Head: Normocephalic and atraumatic.      Nose: Nose normal.   Pulmonary:      Effort: Pulmonary effort is normal. No respiratory distress.      Breath sounds: No wheezing.   Musculoskeletal:         General: No deformity. Normal range of motion.   Skin:     General: Skin is warm.      Capillary Refill: Capillary refill takes less than 2 seconds.   Neurological:      Mental Status: He is alert and oriented to person, place, and time.   Psychiatric:         Behavior: Behavior normal.         Thought Content: Thought content normal.          Lower Extremity Exam: Incision site with delayed healing, improving.  Slight maceration.  No erythema.  CFT " immediate to distal digit.  Sensation intact light touch.  Negative Homans.      Foot/Ankle Exam        Procedures        ASSESSMENT AND PLAN    Diagnoses and all orders for this visit:    1. Toe osteomyelitis (CMS/HCC) (Primary)  -     XR Foot 3+ View Right  -     C-reactive Protein; Future        -Delayed healing to incision site.  Dressing change performed. Dressing changes as instructed.  Radiographs and CRP ordered. Gianna 1 week.           This document has been electronically signed by Kemar Ordaz DPM on Haydee 10, 2021 08:02 CDT     6/10/2021  08:02 CDT

## 2021-06-16 ENCOUNTER — OFFICE VISIT (OUTPATIENT)
Dept: PODIATRY | Facility: CLINIC | Age: 66
End: 2021-06-16

## 2021-06-16 VITALS
BODY MASS INDEX: 18.83 KG/M2 | OXYGEN SATURATION: 98 % | HEIGHT: 67 IN | HEART RATE: 80 BPM | DIASTOLIC BLOOD PRESSURE: 79 MMHG | WEIGHT: 120 LBS | SYSTOLIC BLOOD PRESSURE: 169 MMHG

## 2021-06-16 DIAGNOSIS — T81.31XD DISRUPTION OF EXTERNAL SURGICAL WOUND, SUBSEQUENT ENCOUNTER: Primary | ICD-10-CM

## 2021-06-16 PROCEDURE — 99024 POSTOP FOLLOW-UP VISIT: CPT | Performed by: PODIATRIST

## 2021-06-16 NOTE — PROGRESS NOTES
Jeffry Blanton  1955  66 y.o. male   JUSTINO Junior - 6/2/2021  BS - 316 per pt    06/16/2021     Chief Complaint   Patient presents with   • Right Foot - post op recheck       History of Present Illness    Jeffry Blanton is a 66 y.o.male who presents to clinic for his fourth postoperative visit.   used to communicate with patient.         Past Medical History:   Diagnosis Date   • Diabetes mellitus (CMS/HCC)          Past Surgical History:   Procedure Laterality Date   • ABDOMINAL SURGERY  1990     unknown reason   • AMPUTATION DIGIT Right 5/14/2021    Procedure: AMPUTATION SECOND DIGIT RIGHT;  Surgeon: Kemar Ordaz DPM;  Location: [facility];  Service: Podiatry;  Laterality: Right;         History reviewed. No pertinent family history.    No Known Allergies    Social History     Socioeconomic History   • Marital status:      Spouse name: Not on file   • Number of children: Not on file   • Years of education: Not on file   • Highest education level: Not on file   Tobacco Use   • Smoking status: Never Smoker   • Smokeless tobacco: Never Used   Vaping Use   • Vaping Use: Never used   Substance and Sexual Activity   • Alcohol use: Not Currently   • Drug use: Not Currently   • Sexual activity: Defer         Current Outpatient Medications   Medication Sig Dispense Refill   • Alcohol Swabs (Alcohol Wipes) 70 % pads Use 1 x daily 120 each 11   • B-D ULTRA-FINE 33 LANCETS misc Use  1 x daily , any brand covered by insurance 120 each 11   • Blood Glucose Monitoring Suppl w/Device kit USE AS INDICATED, ANY MONITOR , ICD10 code is E11.9 1 each 1   • Continuous Blood Gluc Sensor (FreeStyle Joshua 2 Sensor) misc 1 each Every 14 (Fourteen) Days. 2 each 11   • Glucagon (Gvoke HypoPen 2-Pack) 1 MG/0.2ML solution auto-injector Inject 1 mg under the skin into the appropriate area as directed As Needed (for hypoglycemia). 0.4 mL 1   • Glucose Blood (Blood Glucose Test) strip Use 1 x daily  "use any brand covered by insurance or same brand as before ICD10 code is E11.9 50 each 11   • Insulin Glargine-Lixisenatide (Soliqua) 100-33 UNT-MCG/ML solution pen-injector injection Up to 30 units before bkfast 3 pen 11   • Insulin Pen Needle (Pen Needles) 32G X 4 MM misc 1 each Daily. Use 1  x daily, Dx code E11.65 100 each 11   • Lancet Devices (Lancing Device) misc USE AS INDICATED TO CORRELATE WITH STRIPS AND METER 1 each 11   • metFORMIN ER (GLUCOPHAGE-XR) 500 MG 24 hr tablet 2 tablet po BID w meals 120 tablet 11   • pioglitazone (Actos) 15 MG tablet Take 1 tablet by mouth Daily. 30 tablet 11   • sulfamethoxazole-trimethoprim (Bactrim DS) 800-160 MG per tablet Take 1 tablet by mouth 2 (Two) Times a Day. 20 tablet 0     No current facility-administered medications for this visit.       Review of Systems   Constitutional: Negative.    HENT: Negative.    Eyes: Negative.    Respiratory: Negative.    Cardiovascular: Negative.    Gastrointestinal: Negative.    Genitourinary: Negative.    Musculoskeletal: Negative.    Psychiatric/Behavioral: Negative.          OBJECTIVE    /79   Pulse 80   Ht 170.2 cm (67\")   Wt 54.4 kg (120 lb)   SpO2 98%   BMI 18.79 kg/m²     Physical Exam  Vitals reviewed.   Constitutional:       General: He is not in acute distress.     Appearance: He is well-developed.   HENT:      Head: Normocephalic and atraumatic.      Nose: Nose normal.   Pulmonary:      Effort: Pulmonary effort is normal. No respiratory distress.      Breath sounds: No wheezing.   Musculoskeletal:         General: No deformity. Normal range of motion.   Skin:     General: Skin is warm.      Capillary Refill: Capillary refill takes less than 2 seconds.   Neurological:      Mental Status: He is alert and oriented to person, place, and time.   Psychiatric:         Behavior: Behavior normal.         Thought Content: Thought content normal.          Lower Extremity Exam: Incision site with delayed healing, improving. "  No maceration.  No erythema.  CFT immediate to distal digit.  Sensation intact light touch.  Negative Homans.      Foot/Ankle Exam        Procedures        ASSESSMENT AND PLAN    Diagnoses and all orders for this visit:    1. Disruption of external surgical wound, subsequent encounter (Primary)        -Delayed healing to incision site.  Dressing change performed. Dressing changes as instructed.  Radiographs reviewed with patient.  No osseous erosion noted.  Patient did not obtain CRP.  Encourage patient to present to the lab for drawing of the CRP lab.  All of his questions were answered.  Recheck 1 week          This document has been electronically signed by Kemar Ordaz DPM on June 16, 2021 15:52 CDT     6/16/2021  15:52 CDT

## 2021-06-21 RX ORDER — INSULIN GLARGINE 100 [IU]/ML
INJECTION, SOLUTION SUBCUTANEOUS
Qty: 3 PEN | Refills: 11 | Status: SHIPPED | OUTPATIENT
Start: 2021-06-21

## (undated) DEVICE — GOWN,PREVENTION PLUS,XLNG/XXLARGE,STRL: Brand: MEDLINE

## (undated) DEVICE — CVR C/ARM MINI

## (undated) DEVICE — GLV SURG SENSICARE PI ORTHO SZ6.5 LF STRL

## (undated) DEVICE — DISPOSABLE TOURNIQUET CUFF SINGLE BLADDER, DUAL PORT AND QUICK CONNECT CONNECTOR: Brand: COLOR CUFF

## (undated) DEVICE — BNDG ELAS CO-FLEX SLF ADHR 3IN5YD LF2 STRL

## (undated) DEVICE — GLV SURG SIGNATURE ESSENTIAL PF LTX SZ7.5

## (undated) DEVICE — STERILE POLYISOPRENE POWDER-FREE SURGICAL GLOVES WITH EMOLLIENT COATING: Brand: PROTEXIS

## (undated) DEVICE — NDL HYPO ECLPS SFTY 25G 1 1/2IN

## (undated) DEVICE — CONTAINER,SPECIMEN,OR STERILE,4OZ: Brand: MEDLINE

## (undated) DEVICE — BANDAGE,GAUZE,BULKEE II,4.5"X4.1YD,STRL: Brand: MEDLINE

## (undated) DEVICE — DRSNG WND GZ CURAD OIL EMULSION 3X3IN STRL

## (undated) DEVICE — CYSTO/BLADDER IRRIGATION SET, REGULATING CLAMP

## (undated) DEVICE — PK POD 60

## (undated) DEVICE — SUT PROLN 3/0 SH D/A 36IN 8522H

## (undated) DEVICE — SUT VIC 3/0 CTI 36IN J944H

## (undated) DEVICE — STCKNT STRL 6X60IN

## (undated) DEVICE — POSTN ELEV LEG PROCARE FM UNIV 45DEG 31.5X10IN

## (undated) DEVICE — PATIENT RETURN ELECTRODE, SINGLE-USE, CONTACT QUALITY MONITORING, ADULT, WITH 9FT CORD, FOR PATIENTS WEIGING OVER 33LBS. (15KG): Brand: MEGADYNE

## (undated) DEVICE — SPNG GZ WOVN 4X4IN 12PLY 10/BX STRL